# Patient Record
Sex: FEMALE | Race: BLACK OR AFRICAN AMERICAN | Employment: PART TIME | URBAN - METROPOLITAN AREA
[De-identification: names, ages, dates, MRNs, and addresses within clinical notes are randomized per-mention and may not be internally consistent; named-entity substitution may affect disease eponyms.]

---

## 2017-07-06 ENCOUNTER — HOSPITAL ENCOUNTER (OUTPATIENT)
Dept: PREADMISSION TESTING | Age: 47
Discharge: HOME | End: 2017-07-06
Payer: COMMERCIAL

## 2017-07-06 VITALS
HEART RATE: 68 BPM | BODY MASS INDEX: 25.74 KG/M2 | WEIGHT: 164 LBS | SYSTOLIC BLOOD PRESSURE: 126 MMHG | TEMPERATURE: 97.6 F | DIASTOLIC BLOOD PRESSURE: 79 MMHG | OXYGEN SATURATION: 100 % | HEIGHT: 67 IN | RESPIRATION RATE: 16 BRPM

## 2017-07-06 LAB
ATRIAL RATE: 64 BPM
BASOPHILS # BLD AUTO: 0 K/UL (ref 0–0.1)
BASOPHILS # BLD: 0 % (ref 0–1)
CALCULATED P AXIS, ECG09: 52 DEGREES
CALCULATED R AXIS, ECG10: 9 DEGREES
CALCULATED T AXIS, ECG11: 23 DEGREES
DIAGNOSIS, 93000: NORMAL
EOSINOPHIL # BLD: 0.1 K/UL (ref 0–0.4)
EOSINOPHIL NFR BLD: 2 % (ref 0–7)
ERYTHROCYTE [DISTWIDTH] IN BLOOD BY AUTOMATED COUNT: 14.5 % (ref 11.5–14.5)
HCT VFR BLD AUTO: 36.3 % (ref 35–47)
HGB BLD-MCNC: 12.1 G/DL (ref 11.5–16)
LYMPHOCYTES # BLD AUTO: 47 % (ref 12–49)
LYMPHOCYTES # BLD: 2.9 K/UL (ref 0.8–3.5)
MCH RBC QN AUTO: 27.8 PG (ref 26–34)
MCHC RBC AUTO-ENTMCNC: 33.3 G/DL (ref 30–36.5)
MCV RBC AUTO: 83.3 FL (ref 80–99)
MONOCYTES # BLD: 0.4 K/UL (ref 0–1)
MONOCYTES NFR BLD AUTO: 7 % (ref 5–13)
NEUTS SEG # BLD: 2.7 K/UL (ref 1.8–8)
NEUTS SEG NFR BLD AUTO: 44 % (ref 32–75)
P-R INTERVAL, ECG05: 182 MS
PLATELET # BLD AUTO: 301 K/UL (ref 150–400)
Q-T INTERVAL, ECG07: 432 MS
QRS DURATION, ECG06: 80 MS
QTC CALCULATION (BEZET), ECG08: 445 MS
RBC # BLD AUTO: 4.36 M/UL (ref 3.8–5.2)
VENTRICULAR RATE, ECG03: 64 BPM
WBC # BLD AUTO: 6.1 K/UL (ref 3.6–11)

## 2017-07-06 PROCEDURE — 85025 COMPLETE CBC W/AUTO DIFF WBC: CPT | Performed by: ANESTHESIOLOGY

## 2017-07-06 PROCEDURE — 36415 COLL VENOUS BLD VENIPUNCTURE: CPT | Performed by: ANESTHESIOLOGY

## 2017-07-06 PROCEDURE — 93005 ELECTROCARDIOGRAM TRACING: CPT

## 2017-07-06 NOTE — PERIOP NOTES
[facility]  PREOPERATIVE INSTRUCTIONS    Surgery Date:   7/11/17    Surgery arrival time given by surgeon: NO  (If [facility] staff will call you between 4pm - 8pm the day before surgery with your arrival time. If your surgery is on a Monday, we will call you the preceding Friday. Please call [phone] after 7pm if you did not receive your arrival time.)    1. Report  to the 2nd Floor Admitting Desk at the time you were told the night before surgery. Bring your insurance card, photo identification, and any copayment (if applicable). 2. You must have a responsible adult to drive you home and stay with you the first 24 hours after surgery if you are going home the same day of your surgery. 3. Nothing to eat or drink after midnight the night before surgery. This means NO water, gum, mints, coffee, juice, etc.    4. No alcoholic beverages 24 hours before and after your surgery. 5. If you are being admitted to the hospital,please leave personal belongings/luggage in your car until you have an assigned hospital room number. 6. The hospital discharge time is 12pm NOON. Your adult  should be here prior to the 12pm NOON discharge time. 7. NO Aspirin and/or any non-steroidal anti-inflammatory drugs (i.e. Ibuprofen, Naproxen, Advil, Aleve) as directed by your surgeon. You may take Tylenol. Stop herbal supplements 1 week prior to  surgery. 8. If you are currently taking Plavix, Coumadin,or any other blood-thinning/ anticoagulant medication contact your surgeon for instructions. 9. Wear comfortable clothes. Wear your glasses instead of contacts. Please leave all money, jewelry and valuables at home. No make up, particularly mascara or finger nail polish, the day of surgery. 10.  REMOVE ALL body piercings, rings,and jewelry and leave at home. Wear your hair loose or down.; no pony-tails, buns, or any metal hair clips.    11. If you shower the morning of surgery, please do not apply any lotions, powders, or deodorants afterwards. Do not shave any body area within 24 hours of your surgery. 12. Please follow all instructions to avoid any potential surgical cancellation. 13. Should your physical condition change, (i.e. fever, cold, flu, etc.) please notify your surgeon as soon as possible. 14. It is important to be on time. If a situation occurs where you may be delayed, please call:  [phone] /  on the day of surgery. 15. The Preadmission Testing staff can be reached at 21 [phone]. 16. MEDICATIONS TO TAKE THE MORNING OF SURGERY WITH A SIP OF WATER: none  17. Special instructions: free  parking,Bring completed PTA medication list with you on the day of your surgery. The patient was contacted  in person. She  verbalize  understanding of all instructions does not  need reinforcement.

## 2017-07-06 NOTE — PERIOP NOTES
Patient stated she would complete the blood transfusion form at home and bring a revised copy of her Advanced Medical  Directives on the AM of surgery . She wanted to speak with  before signing the operative consent and blood  refusal consent .

## 2017-07-10 ENCOUNTER — ANESTHESIA EVENT (OUTPATIENT)
Dept: SURGERY | Age: 47
DRG: 328 | End: 2017-07-10
Payer: COMMERCIAL

## 2017-07-11 ENCOUNTER — ANESTHESIA (OUTPATIENT)
Dept: SURGERY | Age: 47
DRG: 328 | End: 2017-07-11
Payer: COMMERCIAL

## 2017-07-11 ENCOUNTER — HOSPITAL ENCOUNTER (INPATIENT)
Age: 47
LOS: 4 days | Discharge: HOME | DRG: 328 | End: 2017-07-15
Attending: SURGERY | Admitting: SURGERY
Payer: COMMERCIAL

## 2017-07-11 PROBLEM — K21.9 ACID REFLUX: Status: ACTIVE | Noted: 2017-07-11

## 2017-07-11 LAB — HCG UR QL: NEGATIVE

## 2017-07-11 PROCEDURE — 77030011640 HC PAD GRND REM COVD -A: Performed by: SURGERY

## 2017-07-11 PROCEDURE — 74011000250 HC RX REV CODE- 250: Performed by: ANESTHESIOLOGY

## 2017-07-11 PROCEDURE — 77030002912 HC SUT ETHBND J&J -A: Performed by: SURGERY

## 2017-07-11 PROCEDURE — 77030034849: Performed by: SURGERY

## 2017-07-11 PROCEDURE — 77030008771 HC TU NG SALEM SUMP -A: Performed by: ANESTHESIOLOGY

## 2017-07-11 PROCEDURE — 77030018684: Performed by: SURGERY

## 2017-07-11 PROCEDURE — 74011250636 HC RX REV CODE- 250/636: Performed by: SURGERY

## 2017-07-11 PROCEDURE — 77030020747 HC TU INSUF ENDOSC TELE -A: Performed by: SURGERY

## 2017-07-11 PROCEDURE — 74011000250 HC RX REV CODE- 250: Performed by: SURGERY

## 2017-07-11 PROCEDURE — 76210000016 HC OR PH I REC 1 TO 1.5 HR: Performed by: SURGERY

## 2017-07-11 PROCEDURE — 77030032490 HC SLV COMPR SCD KNE COVD -B: Performed by: SURGERY

## 2017-07-11 PROCEDURE — 0BUS4JZ SUPPLEMENT LEFT DIAPHRAGM WITH SYNTH SUB, PERC ENDO APPROACH: ICD-10-PCS | Performed by: SURGERY

## 2017-07-11 PROCEDURE — 74011250636 HC RX REV CODE- 250/636

## 2017-07-11 PROCEDURE — 77030036876 HC STPLR ENDO FIX DEV RELIATACK COVD -F: Performed by: SURGERY

## 2017-07-11 PROCEDURE — 77030002933 HC SUT MCRYL J&J -A: Performed by: SURGERY

## 2017-07-11 PROCEDURE — 76010000162 HC OR TIME 1.5 TO 2 HR INTENSV-TIER 1: Performed by: SURGERY

## 2017-07-11 PROCEDURE — 65270000029 HC RM PRIVATE

## 2017-07-11 PROCEDURE — C9290 INJ, BUPIVACAINE LIPOSOME: HCPCS | Performed by: SURGERY

## 2017-07-11 PROCEDURE — C9113 INJ PANTOPRAZOLE SODIUM, VIA: HCPCS | Performed by: SURGERY

## 2017-07-11 PROCEDURE — 77030036733 HC ENDOLP LIG VCRL SUT J&J -C: Performed by: SURGERY

## 2017-07-11 PROCEDURE — 77030022703 HC LIGASURE  BLNT LAPSCP SEAL COVD -E: Performed by: SURGERY

## 2017-07-11 PROCEDURE — 77030035043 HC TRCR ENDOSC OPTCL BLDLSS COVD -B: Performed by: SURGERY

## 2017-07-11 PROCEDURE — 77030020263 HC SOL INJ SOD CL0.9% LFCR 1000ML: Performed by: SURGERY

## 2017-07-11 PROCEDURE — 77030032490 HC SLV COMPR SCD KNE COVD -B

## 2017-07-11 PROCEDURE — 81025 URINE PREGNANCY TEST: CPT

## 2017-07-11 PROCEDURE — 0DV44ZZ RESTRICTION OF ESOPHAGOGASTRIC JUNCTION, PERCUTANEOUS ENDOSCOPIC APPROACH: ICD-10-PCS | Performed by: SURGERY

## 2017-07-11 PROCEDURE — 77030008684 HC TU ET CUF COVD -B: Performed by: ANESTHESIOLOGY

## 2017-07-11 PROCEDURE — 0BUR4JZ SUPPLEMENT R DIAPHRAGM WITH SYNTH SUB, PERC ENDO APPROACH: ICD-10-PCS | Performed by: SURGERY

## 2017-07-11 PROCEDURE — 77030016151 HC PROTCTR LNS DFOG COVD -B: Performed by: SURGERY

## 2017-07-11 PROCEDURE — 77030020782 HC GWN BAIR PAWS FLX 3M -B

## 2017-07-11 PROCEDURE — 77030026438 HC STYL ET INTUB CARD -A: Performed by: ANESTHESIOLOGY

## 2017-07-11 PROCEDURE — 77030031139 HC SUT VCRL2 J&J -A: Performed by: SURGERY

## 2017-07-11 PROCEDURE — 77030019908 HC STETH ESOPH SIMS -A: Performed by: ANESTHESIOLOGY

## 2017-07-11 PROCEDURE — 74011250636 HC RX REV CODE- 250/636: Performed by: ANESTHESIOLOGY

## 2017-07-11 PROCEDURE — C1781 MESH (IMPLANTABLE): HCPCS | Performed by: SURGERY

## 2017-07-11 PROCEDURE — 77030013079 HC BLNKT BAIR HGGR 3M -A: Performed by: ANESTHESIOLOGY

## 2017-07-11 PROCEDURE — 77030037032 HC INSRT SCIS CLICKLLINE DISP STOR -B: Performed by: SURGERY

## 2017-07-11 PROCEDURE — 76060000034 HC ANESTHESIA 1.5 TO 2 HR: Performed by: SURGERY

## 2017-07-11 PROCEDURE — 77030008606 HC TRCR ENDOSC KII AMR -B: Performed by: SURGERY

## 2017-07-11 PROCEDURE — 74011000250 HC RX REV CODE- 250

## 2017-07-11 DEVICE — MESH HERN W7XL10CM SYN TISS REINF BIOABSRB DISP BIO-A: Type: IMPLANTABLE DEVICE | Site: ESOPHAGUS | Status: FUNCTIONAL

## 2017-07-11 RX ORDER — SODIUM CHLORIDE, SODIUM LACTATE, POTASSIUM CHLORIDE, CALCIUM CHLORIDE 600; 310; 30; 20 MG/100ML; MG/100ML; MG/100ML; MG/100ML
INJECTION, SOLUTION INTRAVENOUS
Status: DISCONTINUED | OUTPATIENT
Start: 2017-07-11 | End: 2017-07-11 | Stop reason: HOSPADM

## 2017-07-11 RX ORDER — KETOROLAC TROMETHAMINE 30 MG/ML
30 INJECTION, SOLUTION INTRAMUSCULAR; INTRAVENOUS EVERY 6 HOURS
Status: DISCONTINUED | OUTPATIENT
Start: 2017-07-12 | End: 2017-07-15 | Stop reason: HOSPADM

## 2017-07-11 RX ORDER — HYDROMORPHONE HCL IN 0.9% NACL 15 MG/30ML
PATIENT CONTROLLED ANALGESIA VIAL INTRAVENOUS CONTINUOUS
Status: DISCONTINUED | OUTPATIENT
Start: 2017-07-11 | End: 2017-07-13

## 2017-07-11 RX ORDER — SODIUM CHLORIDE 0.9 % (FLUSH) 0.9 %
5-10 SYRINGE (ML) INJECTION AS NEEDED
Status: DISCONTINUED | OUTPATIENT
Start: 2017-07-11 | End: 2017-07-11 | Stop reason: HOSPADM

## 2017-07-11 RX ORDER — ONDANSETRON 2 MG/ML
INJECTION INTRAMUSCULAR; INTRAVENOUS AS NEEDED
Status: DISCONTINUED | OUTPATIENT
Start: 2017-07-11 | End: 2017-07-11 | Stop reason: HOSPADM

## 2017-07-11 RX ORDER — NEOSTIGMINE METHYLSULFATE 1 MG/ML
INJECTION INTRAVENOUS AS NEEDED
Status: DISCONTINUED | OUTPATIENT
Start: 2017-07-11 | End: 2017-07-11 | Stop reason: HOSPADM

## 2017-07-11 RX ORDER — SODIUM CHLORIDE, SODIUM LACTATE, POTASSIUM CHLORIDE, CALCIUM CHLORIDE 600; 310; 30; 20 MG/100ML; MG/100ML; MG/100ML; MG/100ML
100 INJECTION, SOLUTION INTRAVENOUS CONTINUOUS
Status: DISCONTINUED | OUTPATIENT
Start: 2017-07-11 | End: 2017-07-11 | Stop reason: HOSPADM

## 2017-07-11 RX ORDER — MIDAZOLAM HYDROCHLORIDE 1 MG/ML
INJECTION, SOLUTION INTRAMUSCULAR; INTRAVENOUS AS NEEDED
Status: DISCONTINUED | OUTPATIENT
Start: 2017-07-11 | End: 2017-07-11 | Stop reason: HOSPADM

## 2017-07-11 RX ORDER — LIDOCAINE HYDROCHLORIDE 10 MG/ML
0.1 INJECTION, SOLUTION EPIDURAL; INFILTRATION; INTRACAUDAL; PERINEURAL AS NEEDED
Status: DISCONTINUED | OUTPATIENT
Start: 2017-07-11 | End: 2017-07-11 | Stop reason: HOSPADM

## 2017-07-11 RX ORDER — SODIUM CHLORIDE 0.9 % (FLUSH) 0.9 %
5-10 SYRINGE (ML) INJECTION EVERY 8 HOURS
Status: DISCONTINUED | OUTPATIENT
Start: 2017-07-11 | End: 2017-07-11 | Stop reason: HOSPADM

## 2017-07-11 RX ORDER — HYDROMORPHONE HYDROCHLORIDE 1 MG/ML
.25-1 INJECTION, SOLUTION INTRAMUSCULAR; INTRAVENOUS; SUBCUTANEOUS
Status: DISCONTINUED | OUTPATIENT
Start: 2017-07-11 | End: 2017-07-11 | Stop reason: HOSPADM

## 2017-07-11 RX ORDER — FENTANYL CITRATE 50 UG/ML
INJECTION, SOLUTION INTRAMUSCULAR; INTRAVENOUS AS NEEDED
Status: DISCONTINUED | OUTPATIENT
Start: 2017-07-11 | End: 2017-07-11 | Stop reason: HOSPADM

## 2017-07-11 RX ORDER — PROCHLORPERAZINE EDISYLATE 5 MG/ML
5 INJECTION INTRAMUSCULAR; INTRAVENOUS
Status: DISCONTINUED | OUTPATIENT
Start: 2017-07-11 | End: 2017-07-15 | Stop reason: HOSPADM

## 2017-07-11 RX ORDER — GLYCOPYRROLATE 0.2 MG/ML
INJECTION INTRAMUSCULAR; INTRAVENOUS AS NEEDED
Status: DISCONTINUED | OUTPATIENT
Start: 2017-07-11 | End: 2017-07-11 | Stop reason: HOSPADM

## 2017-07-11 RX ORDER — DIPHENHYDRAMINE HYDROCHLORIDE 50 MG/ML
25 INJECTION, SOLUTION INTRAMUSCULAR; INTRAVENOUS
Status: DISCONTINUED | OUTPATIENT
Start: 2017-07-11 | End: 2017-07-15 | Stop reason: HOSPADM

## 2017-07-11 RX ORDER — PROPOFOL 10 MG/ML
INJECTION, EMULSION INTRAVENOUS AS NEEDED
Status: DISCONTINUED | OUTPATIENT
Start: 2017-07-11 | End: 2017-07-11 | Stop reason: HOSPADM

## 2017-07-11 RX ORDER — DEXAMETHASONE SODIUM PHOSPHATE 4 MG/ML
INJECTION, SOLUTION INTRA-ARTICULAR; INTRALESIONAL; INTRAMUSCULAR; INTRAVENOUS; SOFT TISSUE AS NEEDED
Status: DISCONTINUED | OUTPATIENT
Start: 2017-07-11 | End: 2017-07-11 | Stop reason: HOSPADM

## 2017-07-11 RX ORDER — ENOXAPARIN SODIUM 100 MG/ML
40 INJECTION SUBCUTANEOUS EVERY 24 HOURS
Status: DISCONTINUED | OUTPATIENT
Start: 2017-07-11 | End: 2017-07-15 | Stop reason: HOSPADM

## 2017-07-11 RX ORDER — ROCURONIUM BROMIDE 10 MG/ML
INJECTION, SOLUTION INTRAVENOUS AS NEEDED
Status: DISCONTINUED | OUTPATIENT
Start: 2017-07-11 | End: 2017-07-11 | Stop reason: HOSPADM

## 2017-07-11 RX ORDER — SUCCINYLCHOLINE CHLORIDE 20 MG/ML
INJECTION INTRAMUSCULAR; INTRAVENOUS AS NEEDED
Status: DISCONTINUED | OUTPATIENT
Start: 2017-07-11 | End: 2017-07-11 | Stop reason: HOSPADM

## 2017-07-11 RX ORDER — ONDANSETRON 2 MG/ML
4 INJECTION INTRAMUSCULAR; INTRAVENOUS EVERY 4 HOURS
Status: DISCONTINUED | OUTPATIENT
Start: 2017-07-11 | End: 2017-07-15 | Stop reason: HOSPADM

## 2017-07-11 RX ORDER — CEFAZOLIN SODIUM IN 0.9 % NACL 2 G/50 ML
2 INTRAVENOUS SOLUTION, PIGGYBACK (ML) INTRAVENOUS ONCE
Status: COMPLETED | OUTPATIENT
Start: 2017-07-11 | End: 2017-07-11

## 2017-07-11 RX ORDER — SODIUM CHLORIDE 9 MG/ML
75 INJECTION, SOLUTION INTRAVENOUS CONTINUOUS
Status: DISCONTINUED | OUTPATIENT
Start: 2017-07-11 | End: 2017-07-13

## 2017-07-11 RX ORDER — LIDOCAINE HYDROCHLORIDE 20 MG/ML
INJECTION, SOLUTION EPIDURAL; INFILTRATION; INTRACAUDAL; PERINEURAL AS NEEDED
Status: DISCONTINUED | OUTPATIENT
Start: 2017-07-11 | End: 2017-07-11 | Stop reason: HOSPADM

## 2017-07-11 RX ADMIN — PROMETHAZINE HYDROCHLORIDE 6.25 MG: 25 INJECTION INTRAMUSCULAR; INTRAVENOUS at 17:57

## 2017-07-11 RX ADMIN — FENTANYL CITRATE 100 MCG: 50 INJECTION, SOLUTION INTRAMUSCULAR; INTRAVENOUS at 15:22

## 2017-07-11 RX ADMIN — ROCURONIUM BROMIDE 5 MG: 10 INJECTION, SOLUTION INTRAVENOUS at 15:22

## 2017-07-11 RX ADMIN — SODIUM CHLORIDE, SODIUM LACTATE, POTASSIUM CHLORIDE, AND CALCIUM CHLORIDE 100 ML/HR: 600; 310; 30; 20 INJECTION, SOLUTION INTRAVENOUS at 12:46

## 2017-07-11 RX ADMIN — GLYCOPYRROLATE 0.4 MG: 0.2 INJECTION INTRAMUSCULAR; INTRAVENOUS at 17:02

## 2017-07-11 RX ADMIN — ENOXAPARIN SODIUM 40 MG: 40 INJECTION SUBCUTANEOUS at 20:47

## 2017-07-11 RX ADMIN — SODIUM CHLORIDE 75 ML/HR: 900 INJECTION, SOLUTION INTRAVENOUS at 17:41

## 2017-07-11 RX ADMIN — MIDAZOLAM HYDROCHLORIDE 2 MG: 1 INJECTION, SOLUTION INTRAMUSCULAR; INTRAVENOUS at 15:15

## 2017-07-11 RX ADMIN — ROCURONIUM BROMIDE 10 MG: 10 INJECTION, SOLUTION INTRAVENOUS at 15:28

## 2017-07-11 RX ADMIN — ONDANSETRON 4 MG: 2 INJECTION INTRAMUSCULAR; INTRAVENOUS at 16:57

## 2017-07-11 RX ADMIN — HYDROMORPHONE HYDROCHLORIDE 1 MG: 1 INJECTION, SOLUTION INTRAMUSCULAR; INTRAVENOUS; SUBCUTANEOUS at 17:48

## 2017-07-11 RX ADMIN — ONDANSETRON 4 MG: 2 INJECTION INTRAMUSCULAR; INTRAVENOUS at 20:48

## 2017-07-11 RX ADMIN — SUCCINYLCHOLINE CHLORIDE 100 MG: 20 INJECTION INTRAMUSCULAR; INTRAVENOUS at 15:22

## 2017-07-11 RX ADMIN — PROPOFOL 150 MG: 10 INJECTION, EMULSION INTRAVENOUS at 15:22

## 2017-07-11 RX ADMIN — DEXAMETHASONE SODIUM PHOSPHATE 4 MG: 4 INJECTION, SOLUTION INTRA-ARTICULAR; INTRALESIONAL; INTRAMUSCULAR; INTRAVENOUS; SOFT TISSUE at 15:26

## 2017-07-11 RX ADMIN — NEOSTIGMINE METHYLSULFATE 2 MG: 1 INJECTION INTRAVENOUS at 17:02

## 2017-07-11 RX ADMIN — FENTANYL CITRATE 25 MCG: 50 INJECTION, SOLUTION INTRAMUSCULAR; INTRAVENOUS at 16:49

## 2017-07-11 RX ADMIN — FENTANYL CITRATE 25 MCG: 50 INJECTION, SOLUTION INTRAMUSCULAR; INTRAVENOUS at 15:50

## 2017-07-11 RX ADMIN — HYDROMORPHONE HYDROCHLORIDE 0.5 MG: 1 INJECTION, SOLUTION INTRAMUSCULAR; INTRAVENOUS; SUBCUTANEOUS at 18:09

## 2017-07-11 RX ADMIN — CEFAZOLIN 2 G: 1 INJECTION, POWDER, FOR SOLUTION INTRAMUSCULAR; INTRAVENOUS; PARENTERAL at 15:37

## 2017-07-11 RX ADMIN — SODIUM CHLORIDE, SODIUM LACTATE, POTASSIUM CHLORIDE, CALCIUM CHLORIDE: 600; 310; 30; 20 INJECTION, SOLUTION INTRAVENOUS at 15:35

## 2017-07-11 RX ADMIN — FENTANYL CITRATE 50 MCG: 50 INJECTION, SOLUTION INTRAMUSCULAR; INTRAVENOUS at 17:17

## 2017-07-11 RX ADMIN — SODIUM CHLORIDE 40 MG: 9 INJECTION, SOLUTION INTRAMUSCULAR; INTRAVENOUS; SUBCUTANEOUS at 20:48

## 2017-07-11 RX ADMIN — FENTANYL CITRATE 50 MCG: 50 INJECTION, SOLUTION INTRAMUSCULAR; INTRAVENOUS at 16:52

## 2017-07-11 RX ADMIN — DIPHENHYDRAMINE HYDROCHLORIDE 25 MG: 50 INJECTION, SOLUTION INTRAMUSCULAR; INTRAVENOUS at 21:45

## 2017-07-11 RX ADMIN — Medication: at 17:42

## 2017-07-11 RX ADMIN — ROCURONIUM BROMIDE 35 MG: 10 INJECTION, SOLUTION INTRAVENOUS at 15:25

## 2017-07-11 RX ADMIN — LIDOCAINE HYDROCHLORIDE 80 MG: 20 INJECTION, SOLUTION EPIDURAL; INFILTRATION; INTRACAUDAL; PERINEURAL at 15:22

## 2017-07-11 NOTE — OP NOTES
OPERATIVE NOTE    Date of Procedure: 7/11/2017   Preoperative Diagnosis: SYMPTOMATIC PARAESOPHAGEAL HERNIA, REFRACTORY REFLUX  Postoperative Diagnosis: SAME  Procedure(s):   LAPAROSCOPIC PARAESOPHAGEAL HERNIA REPAIR WITH MESH  NISSEN FUNDOPLICATION  Surgeon(s) and Role:     * Jeffrey Wu MD - Primary         Assistant Staff:       Surgical Staff:  Circ-1: Molly Gutierrez RN  Circ-Relief: Breonna Camejo RN  Scrub Tech-1: Melany Meehan  Scrub Tech-Relief: Valentino Romance  Surg Asst-1: Stacy Zepeda  Surg Asst-Relief: Madonna Armijo; Lasha Escudero RN  Event Time In   Incision Start 1550   Incision Close 1708     Anesthesia: General   Estimated Blood Loss: Min  Specimens: * No specimens in log *   Findings: 6 cm hiatus   Complications: none  Implants:     Implant Name Type Inv. Item Serial No.  Lot No. LRB No. Used Action   MESH SHT BIO ABSORBABLE 7X10CM --  - [id number]   MESH SHT BIO ABSORBABLE 7X10CM --  [id number] [employer] N/A N/A 1 Implanted     INDICATION:  See Paper H/P. PROCEDURE:  The patient was placed on the operating table and secured in supine position. Sequential compression devices were applied.  General anesthesia was induced successfully. A time-out completed, verifying the correct the patient, procedure site, positioning, implants, and/or special equipment prior to beginning the case.    Orogastric tube was used to decompress the stomach and urinary catheter placed.  A foot board was placed and the patient was secure to the bed protecting a soft bend position of the knees.  The abdomen was prepped and draped in the usual sterile fashion.   The bed anchor of the liver retractor was placed under the right axilla of the patient, allowing visual clearance of the liver retractor post prior to draping.  At no time was the retractor in contact with the patient.  Through a left side, mid-axillary subcostal 5mm incision, Veress needle insufflation was established and maintained at 15mm Hg. The abdomen was then entered under direct camera vision with a 5 mm blunt tissue dissecting port.  The remaining ports were placed in the following fashion under direct endoscopic vision: Left side, midclavicular subcostal 5 mm port, subxiphoid 5 mm port, 8 mm midline port directly adjacent to the base of the falciform ligament, hands breath caudal from the subxiphoid port, and right side 5 mm midaxillary subcostal port.  No injuries were noted during port placement or during initial entry.       The patient was placed in steep reverse Trendelenburg position. Liver retractor was introduced in the right subcostal port to elevate the left lobe of liver and expose the hiatus. The hiatus revealed incarcerated stomach, 3 cm anterior opening.    Representative images were taken for the paper chart.  Pars flaccida and gastrohepatic ligament were opened with electrocautery. The right dianne was identified, opened and the plane between the right dianne - esophagus and the crural desiccation visualized. Dissection continued to peritoneum anterior over phrenoesophageal hiatus to left dianne. Avascular plane was opened bluntly. Dissection was then carried to crux of the crura. Short gastric vessels adjacent to the reduced cardia were taken gently with a vessel sealing device, being careful not to tear vascular connections to the spleen. This plane was opened to the posterior gastroesophageal junction and met with the previous right sided dissection. The left dianne was then similarly dissected, periotoneal covering left intact and the phrenoesophageal ligament was completely divided anteriorly. Hernia contents were gently pulled down from the chest below the diaphragm.  The vagus nerves were identified and protected. The esophagus was gently elevated with a closed grasper and dissection continued until the esophagus was fully mobilized. The orogastric tube was removed.  A penrose catheter was then passed under the esophagus and controlled with a Vicryl endo-loop. The esophagus was encircled to include the vagus nerves. At least 2 cm of intraperitoneal esophagus was visualized. 42F Bougie was passed by Anesthesia transorally into the stomach. The crura were re-approximated posteriorly with 0 Ethibond sutures, allowing a grasper to easily pass posteriorly. The bioabsorbable mesh was prepared and passed into the field. The mesh was tacked into place around the crura with absorbable tacks avoiding vena cava and aorta. Fundus was passed through the retroesophageal window, crossing midline for a 360 degree loose wrap. A shoe-shine technique was performed, allowing the proposed wrap position to lie in position without manipulation. A 3 cm wrap was secured in place with three interrupted 0 Ethi-bond sutures, with the first and second sutures incorporating some esophageal muscle. The bougie was removed under direct vision. 12 o'clock posterior wrap to right dianne Ethibond stitch, followed by 3 o'clock posterior left wrap to left dianne Ethibond stitch were placed in the natural lie position of the wrap. The liver retractor was removed under direct visualization and the liver inspected for any bleeding. Ports were removed and replaced sequentially looking for bleeding. The gallbladder, instruments and ports were removed from the field and pneumoperitoneum terminally released. The skin incisions were all closed with 4-0 monocryl, steristrips and tegaderm.  The patient tolerated the procedure well and was taken to the postanesthesia care unit in stable and satisfactory condition. I discussed the findings of the surgery with her family in the recovery area.     Nette Forbes MD

## 2017-07-11 NOTE — IP AVS SNAPSHOT
Chiqui Bills 
 
 
 [address] [facility] 
[phone] Patient: Isiah Cantu MRN: [id number] DMO:[birth year] You are allergic to the following Allergen Reactions Percocet (Oxycodone-Acetaminophen) Hives Itching Recent Documentation Height Weight Breastfeeding? BMI OB Status Smoking Status 1.702 m 74.4 kg No 25.69 kg/m2 Having regular periods Never Smoker Emergency Contacts Name Discharge Info Relation Home Work Mobile [facility] CAREGIVER [3] Spouse [3] [phone] About your hospitalization You were admitted on:  July 11, 2017 You last received care in the:  [facility] 4M POST SURG ORT 1 You were discharged on:  July 15, 2017 Unit phone number:  [phone] Why you were hospitalized Your primary diagnosis was:  Not on File Your diagnoses also included:  Acid Reflux Providers Seen During Your Hospitalizations Provider Role Specialty Primary office phone Cory Jessica MD Attending Provider General Surgery [phone] Your Primary Care Physician (PCP) Primary Care Physician Office Phone Office Fax NONE ** None ** ** None ** Follow-up Information Follow up With Details Comments Contact Info Cory Jessica MD Schedule an appointment as soon as possible for a visit in 2 weeks  [address] [facility] Surgical [address] 
[phone] None   None ([phone]) Patient stated that they have no PCP Cory Jessica MD In 2 weeks  [address] 
[phone] Current Discharge Medication List  
  
START taking these medications Dose & Instructions Dispensing Information Comments Morning Noon Evening Bedtime  
 acetaminophen 32MG/ML Soln solution Commonly known as:  TYLENOL  
   
 Your last dose was: Your next dose is:    
   
   
 Dose:  650 mg Take 20.3 mL by mouth every four (4) hours as needed. Indications: Pain, headache Quantity:  354 mL Refills:  1 HYDROmorphone 2 mg tablet Commonly known as:  DILAUDID Your last dose was: Your next dose is:    
   
   
 Dose:  2 mg Take 1 Tab by mouth every four (4) hours as needed. Max Daily Amount: 12 mg. Indications: Pain, Acute postoperative pain Quantity:  30 Tab Refills:  0  
     
   
   
   
  
 ondansetron 4 mg disintegrating tablet Commonly known as:  ZOFRAN ODT Your last dose was: Your next dose is:    
   
   
 Dose:  4 mg Take 1 Tab by mouth every eight (8) hours as needed for Nausea. Indications: PREVENTION OF POST-OPERATIVE NAUSEA AND VOMITING Quantity:  12 Tab Refills:  1  
     
   
   
   
  
 polyethylene glycol 17 gram packet Commonly known as:  Saul Pritchett Your last dose was: Your next dose is:    
   
   
 Dose:  17 g Take 1 Packet by mouth daily as needed. Indications: constipation Quantity:  10 Packet Refills:  1 CONTINUE these medications which have NOT CHANGED Dose & Instructions Dispensing Information Comments Morning Noon Evening Bedtime FLAXSEED OIL PO Your last dose was: Your next dose is:    
   
   
 Dose:  1000 mg Take 1,000 mg by mouth daily. Refills:  0  
     
   
   
   
  
 OTHER Your last dose was: Your next dose is:    
   
   
 Dose:  1 Tab 1 Tab daily. Super B vitamin Refills:  0 Where to Get Your Medications Information on where to get these meds will be given to you by the nurse or doctor. ! Ask your nurse or doctor about these medications  
  acetaminophen 32MG/ML Soln solution HYDROmorphone 2 mg tablet  
 ondansetron 4 mg disintegrating tablet  
 polyethylene glycol 17 gram packet Discharge Instructions Nissen Fundoplication: What to Expect at [facility] Your Recovery You may be sore and have some pain in your belly for several weeks after surgery. If you had laparoscopic surgery, you also may have pain near your shoulder for a day or two after surgery. It may be hard for you to swallow for up to 6 weeks after the surgery. You may also have cramping in your belly, feel bloated, or pass more gas than before. When you burp, you may not get as much relief as you did before the surgery. The cramping and bloating usually go away in 2 to 3 months, but you may continue to pass more gas for a long time. Because the surgery makes your stomach a little smaller, you may get full more quickly when you eat. In 2 to 3 months, the stomach adjusts and you will be able to eat your usual amounts of food. How quickly you recover depends on whether you had a laparoscopic or open surgery. After laparoscopic surgery, most people can go back to work or their normal routine in about 2 to 3 weeks, depending on their work. After open surgery, you may need 4 to 6 weeks to get back to your normal routine. This care sheet gives you a general idea about how long it will take for you to recover. But each person recovers at a different pace. Follow the steps below to get better as quickly as possible. How can you care for yourself at home? Activity · Rest when you feel tired. Getting enough sleep will help you recover. · Try to walk each day. Start out by walking a little more than you did the day before. Bit by bit, increase the amount you walk. Walking boosts blood flow and helps prevent pneumonia and constipation. · For about 2 weeks, or 4 to 6 weeks if you had an open surgery, avoid lifting objects heavier than about 15 to 20 pounds. This may include heavy grocery bags and milk containers, a heavy briefcase or backpack, cat litter or dog food bags, a vacuum , or a child. · Do not do sit-ups or any exercise or activity that uses your belly muscles. · You can be active and do things around the house as you can tolerate it. Do not take part in any activity where you could be hit in the belly. This could be sports or playing with children. · You may shower. Pat your incisions dry. If you had an open surgery, you need to keep the incision dry until it begins to heal. Do not take baths until your doctor says it is okay. · Ask your doctor when you can drive again. · Ask your doctor when it is okay for you to have sex. Diet · From now until August 4th (three weeks) , stay on a liquid or soft diet. This includes broths, soups, milk shakes, puddings, and mashed potatoes. When you can eat these without difficulty (after August 4th), try eating foods that are easy to swallow, such as ground meat, shredded chicken, fish, pasta, and soft vegetables. · Have 5 or 6 small meals each day instead of 2 or 3 large meals. · Chew each bite of food very well. Eat slowly. You may need to take 20 to 30 minutes to eat a meal. 
· Avoid crusty breads, bagels, tough meats, raw vegetables, nuts and seeds (including crackers and breads that have nuts and seeds), and other foods that are hard to digest. 
· If you feel full quickly, try to drink fluids between meals instead of with meals. · Avoid carbonated beverages, such as soda pop. · Avoid drinking with straws. This may help you swallow less air when you drink. · Gradually return to your normal foods. This usually takes 4 to 6 weeks. · You may notice that your bowel movements are not regular right after your surgery. This is common. Try to avoid constipation and straining with bowel movements. Take a fiber supplement every day. If you have not had a bowel movement after a couple of days, ask your doctor about taking a mild laxative. Medicines · Your doctor will tell you if and when you can restart your medicines.  He or she will also give you instructions about taking any new medicines. · If you take blood thinners, such as warfarin (Coumadin), clopidogrel (Plavix), or aspirin, be sure to talk to your doctor. He or she will tell you if and when to start taking those medicines again. Make sure that you understand exactly what your doctor wants you to do. · Take pain medicines exactly as directed. ¨ If the doctor gave you a prescription medicine for pain, take it as prescribed. ¨ If you are not taking a prescription pain medicine, ask your doctor if you can take an over-the-counter medicine. · If you think your pain medicine is making you sick to your stomach: 
¨ Take your medicine after meals (unless your doctor tells you not to). ¨ Ask your doctor for a different pain medicine. · If your doctor prescribed antibiotics, take them as directed. Do not stop taking them just because you feel better. You need to take the full course of antibiotics. · Continue to take your acid-reducing medicine for 1 month after surgery or as your doctor tells you. Incision care · If you have strips of tape on the cuts the doctor made (incisions), leave the tape on for a week or until it falls off. · Wash the area daily with warm, soapy water and pat it dry. Don't use hydrogen peroxide or alcohol, which can slow healing. You may cover the area with a gauze bandage if it weeps or rubs against clothing. Change the bandage every day. · Keep the area clean and dry. Follow-up care is a key part of your treatment and safety. Be sure to make and go to all appointments, and call your doctor if you are having problems. It's also a good idea to know your test results and keep a list of the medicines you take. When should you call for help? Call 911 anytime you think you may need emergency care. For example, call if: 
· You passed out (lost consciousness). · You have severe trouble breathing. · You have sudden chest pain and shortness of breath, or you cough up blood. · You have severe pain in your belly or chest. 
Call your doctor now or seek immediate medical care if: 
· You are sick to your stomach or cannot keep fluids down. · You have pain that does not get better after you take pain medicine. · You have signs of infection, such as: 
¨ Increased pain, swelling, warmth, or redness. ¨ Red streaks leading from the incision. ¨ Pus draining from the incision. ¨ A fever. · You have loose stitches, or your incision comes open. · You have signs of a blood clot, such as: 
¨ Pain in your calf, back of the knee, thigh, or groin. ¨ Redness and swelling in your leg or groin. · You have trouble passing urine or stool, especially if you have pain or swelling in your lower belly. Watch closely for any changes in your health, and be sure to contact your doctor if: 
· You have a cough that does not go away or one that brings up mucus. · You have shoulder pain that lasts more than 3 days. · You do not have a bowel movement after taking a laxative. Where can you learn more? Go to http://tono-verona.info/. Enter U659 in the search box to learn more about \"Nissen Fundoplication: What to Expect at Home. \" Current as of: August 9, 2016 Content Version: 11.3 © 0854-0530 [employer]. Care instructions adapted under license by [employer] (which disclaims liability or warranty for this information). If you have questions about a medical condition or this instruction, always ask your healthcare professional. [place] [address] any warranty or liability for your use of this information. Discharge Orders None Introducing [facility]! [place] introduces [employer] patient portal. Now you can access parts of your medical record, email your doctor's office, and request medication refills online.    
 
1. In your internet browser, go to [email or web address] 2. Click on the First Time User? Click Here link in the Sign In box. You will see the New Member Sign Up page. 3. Enter your [employer] Access Code exactly as it appears below. You will not need to use this code after youve completed the sign-up process. If you do not sign up before the expiration date, you must request a new code. · [employer] Access Code: [email or web address] Expires: 10/4/2017  7:35 AM 
 
4. Enter the last four digits of your Social Security Number (xxxx) and Date of Birth (mm/dd/yyyy) as indicated and click Submit. You will be taken to the next sign-up page. 5. Create a [employer] ID. This will be your [employer] login ID and cannot be changed, so think of one that is secure and easy to remember. 6. Create a [employer] password. You can change your password at any time. 7. Enter your Password Reset Question and Answer. This can be used at a later time if you forget your password. 8. Enter your e-mail address. You will receive e-mail notification when new information is available in [address] 9. Click Sign Up. You can now view and download portions of your medical record. 10. Click the Download Summary menu link to download a portable copy of your medical information. If you have questions, please visit the Frequently Asked Questions section of the [employer] website. Remember, [employer] is NOT to be used for urgent needs. For medical emergencies, dial 911. Now available from your iPhone and Android! General Information Please provide this summary of care documentation to your next provider. Patient Signature:  ____________________________________________________________ Date:  ____________________________________________________________  
  
James Ala Provider Signature:  ____________________________________________________________ Date:  ____________________________________________________________

## 2017-07-11 NOTE — BRIEF OP NOTE
OPERATIVE NOTE    Date of Procedure: 7/11/2017   Preoperative Diagnosis: SYMPTOMATIC PARAESOPHAGEAL HERNIA, REFRACTORY REFLUX  Postoperative Diagnosis: SAME  Procedure(s):   LAPAROSCOPIC PARAESOPHAGEAL HERNIA REPAIR WITH MESH  NISSEN FUNDOPLICATION  Surgeon(s) and Role:     * Cory Jessica MD - Primary         Assistant Staff:       Surgical Staff:  Circ-1: Vamshi Roper RN  Circ-Relief: Sai Marin RN  Scrub Tech-1: Edwin Gerson  Scrub Tech-Relief: Khalif Kaba  Surg Asst-1: Juan Francisco Hanna  Surg Asst-Relief: Kayleen Mccord; Sid Holman RN  Event Time In   Incision Start 1550   Incision Close 1708     Anesthesia: General   Estimated Blood Loss: Min  Specimens: * No specimens in log *   Findings: 6 cm hiatus   Complications: none  Implants:     Implant Name Type Inv. Item Serial No.  Lot No. LRB No. Used Action   MESH SHT BIO ABSORBABLE 7X10CM --  - [id number]   MESH SHT BIO ABSORBABLE 7X10CM --  [id number] N/A N/A 1 Implanted     INDICATION:  See Paper H/P. PROCEDURE:  The patient was placed on the operating table and secured in supine position. Sequential compression devices were applied.  General anesthesia was induced successfully. A time-out completed, verifying the correct the patient, procedure site, positioning, implants, and/or special equipment prior to beginning the case.    Orogastric tube was used to decompress the stomach and urinary catheter placed.  A foot board was placed and the patient was secure to the bed protecting a soft bend position of the knees.  The abdomen was prepped and draped in the usual sterile fashion.   The bed anchor of the liver retractor was placed under the right axilla of the patient, allowing visual clearance of the liver retractor post prior to draping.  At no time was the retractor in contact with the patient.  Through a left side, mid-axillary subcostal 5mm incision, Veress needle insufflation was established and maintained at 15mm Hg. The abdomen was then entered under direct camera vision with a 5 mm blunt tissue dissecting port.  The remaining ports were placed in the following fashion under direct endoscopic vision: Left side, midclavicular subcostal 5 mm port, subxiphoid 5 mm port, 8 mm midline port directly adjacent to the base of the falciform ligament, hands breath caudal from the subxiphoid port, and right side 5 mm midaxillary subcostal port.  No injuries were noted during port placement or during initial entry.       The patient was placed in steep reverse Trendelenburg position. Liver retractor was introduced in the right subcostal port to elevate the left lobe of liver and expose the hiatus. The hiatus revealed incarcerated stomach, 3 cm anterior opening.    Representative images were taken for the paper chart.  Pars flaccida and gastrohepatic ligament were opened with electrocautery. The right dianne was identified, opened and the plane between the right dianne - esophagus and the crural desiccation visualized. Dissection continued to peritoneum anterior over phrenoesophageal hiatus to left dianne. Avascular plane was opened bluntly. Dissection was then carried to crux of the crura. Short gastric vessels adjacent to the reduced cardia were taken gently with a vessel sealing device, being careful not to tear vascular connections to the spleen. This plane was opened to the posterior gastroesophageal junction and met with the previous right sided dissection. The left dianne was then similarly dissected, periotoneal covering left intact and the phrenoesophageal ligament was completely divided anteriorly. Hernia contents were gently pulled down from the chest below the diaphragm.  The vagus nerves were identified and protected. The esophagus was gently elevated with a closed grasper and dissection continued until the esophagus was fully mobilized. The orogastric tube was removed.  A penrose catheter was then passed under the esophagus and controlled with a Vicryl endo-loop. The esophagus was encircled to include the vagus nerves. At least 2 cm of intraperitoneal esophagus was visualized. 42F Bougie was passed by Anesthesia transorally into the stomach. The crura were re-approximated posteriorly with 0 Ethibond sutures, allowing a grasper to easily pass posteriorly. The bioabsorbable mesh was prepared and passed into the field. The mesh was tacked into place around the crura with absorbable tacks avoiding vena cava and aorta. Fundus was passed through the retroesophageal window, crossing midline for a 360 degree loose wrap. A shoe-shine technique was performed, allowing the proposed wrap position to lie in position without manipulation. A 3 cm wrap was secured in place with three interrupted 0 Ethi-bond sutures, with the first and second sutures incorporating some esophageal muscle. The bougie was removed under direct vision. 12 o'clock posterior wrap to right dianne Ethibond stitch, followed by 3 o'clock posterior left wrap to left dianne Ethibond stitch were placed in the natural lie position of the wrap. The liver retractor was removed under direct visualization and the liver inspected for any bleeding. Ports were removed and replaced sequentially looking for bleeding. The gallbladder, instruments and ports were removed from the field and pneumoperitoneum terminally released. The skin incisions were all closed with 4-0 monocryl, steristrips and tegaderm.  The patient tolerated the procedure well and was taken to the postanesthesia care unit in stable and satisfactory condition. I discussed the findings of the surgery with her family in the recovery area.     Olga Olson MD

## 2017-07-11 NOTE — PERIOP NOTES
TRANSFER - OUT REPORT:    Verbal report given to dolores rn(name) on Cassie Pham  being transferred to [facility](unit) for routine post - op       Report consisted of patients Situation, Background, Assessment and   Recommendations(SBAR). Information from the following report(s) SBAR, Procedure Summary, Intake/Output and MAR was reviewed with the receiving nurse. Lines:   Peripheral IV 07/11/17 Right;Upper Arm (Active)   Site Assessment Clean, dry, & intact 7/11/2017  5:24 PM   Phlebitis Assessment 0 7/11/2017  5:24 PM   Infiltration Assessment 0 7/11/2017  5:24 PM   Dressing Status Clean, dry, & intact 7/11/2017  5:24 PM   Dressing Type Tape;Transparent 7/11/2017  5:24 PM   Hub Color/Line Status Pink 7/11/2017  5:24 PM   Action Taken Open ports on tubing capped 7/11/2017 12:45 PM   Alcohol Cap Used Yes 7/11/2017 12:45 PM       Peripheral IV 07/11/17 Left Wrist (Active)   Site Assessment Clean, dry, & intact 7/11/2017  5:24 PM   Phlebitis Assessment 0 7/11/2017  5:24 PM   Infiltration Assessment 0 7/11/2017  5:24 PM   Dressing Status Clean, dry, & intact 7/11/2017  5:24 PM   Dressing Type Tape;Transparent 7/11/2017  5:24 PM   Hub Color/Line Status Green 7/11/2017  5:24 PM        Opportunity for questions and clarification was provided.       Patient transported with:   O2 @ 2 liters  Registered Nurse

## 2017-07-11 NOTE — IP AVS SNAPSHOT
Current Discharge Medication List  
  
START taking these medications Dose & Instructions Dispensing Information Comments Morning Noon Evening Bedtime  
 acetaminophen 32MG/ML Soln solution Commonly known as:  TYLENOL Your last dose was: Your next dose is:    
   
   
 Dose:  650 mg Take 20.3 mL by mouth every four (4) hours as needed. Indications: Pain, headache Quantity:  354 mL Refills:  1 HYDROmorphone 2 mg tablet Commonly known as:  DILAUDID Your last dose was: Your next dose is:    
   
   
 Dose:  2 mg Take 1 Tab by mouth every four (4) hours as needed. Max Daily Amount: 12 mg. Indications: Pain, Acute postoperative pain Quantity:  30 Tab Refills:  0  
     
   
   
   
  
 ondansetron 4 mg disintegrating tablet Commonly known as:  ZOFRAN ODT Your last dose was: Your next dose is:    
   
   
 Dose:  4 mg Take 1 Tab by mouth every eight (8) hours as needed for Nausea. Indications: PREVENTION OF POST-OPERATIVE NAUSEA AND VOMITING Quantity:  12 Tab Refills:  1  
     
   
   
   
  
 polyethylene glycol 17 gram packet Commonly known as:  Julia Plane Your last dose was: Your next dose is:    
   
   
 Dose:  17 g Take 1 Packet by mouth daily as needed. Indications: constipation Quantity:  10 Packet Refills:  1 CONTINUE these medications which have NOT CHANGED Dose & Instructions Dispensing Information Comments Morning Noon Evening Bedtime FLAXSEED OIL PO Your last dose was: Your next dose is:    
   
   
 Dose:  1000 mg Take 1,000 mg by mouth daily. Refills:  0  
     
   
   
   
  
 OTHER Your last dose was: Your next dose is:    
   
   
 Dose:  1 Tab 1 Tab daily. Super B vitamin Refills:  0 Where to Get Your Medications Information on where to get these meds will be given to you by the nurse or doctor. ! Ask your nurse or doctor about these medications  
  acetaminophen 32MG/ML Soln solution HYDROmorphone 2 mg tablet  
 ondansetron 4 mg disintegrating tablet  
 polyethylene glycol 17 gram packet

## 2017-07-11 NOTE — ANESTHESIA POSTPROCEDURE EVALUATION
Post-Anesthesia Evaluation and Assessment    Patient: Kali Larson MRN: [id number]  SSN: [id number]    YOB: 1970  Age: 55 y.o. Sex: female       Cardiovascular Function/Vital Signs  Visit Vitals    /89    Pulse 78    Temp 36.4 °C (97.6 °F)    Resp 19    SpO2 100%       Patient is status post general anesthesia for Procedure(s):  LAPAROSCOPIC PARAESOPHAGEAL HERNIA REPAIR WITH MESH/NISSEN FUNDOPLICATION. Nausea/Vomiting: None    Postoperative hydration reviewed and adequate. Pain:  Pain Scale 1: Numeric (0 - 10) (07/11/17 1809)  Pain Intensity 1: 9 (07/11/17 1809)   Managed    Neurological Status:   Neuro (WDL): Within Defined Limits (07/11/17 1721)  Neuro  LUE Motor Response: Purposeful (07/11/17 1721)  LLE Motor Response: Purposeful (07/11/17 1721)  RUE Motor Response: Purposeful (07/11/17 1721)  RLE Motor Response: Purposeful (07/11/17 1721)   At baseline    Mental Status and Level of Consciousness: Arousable    Pulmonary Status:   O2 Device: Nasal cannula (07/11/17 1721)   Adequate oxygenation and airway patent    Complications related to anesthesia: None    Post-anesthesia assessment completed.  No concerns    Signed By: Kennedi Gonzalez MD     July 11, 2017

## 2017-07-11 NOTE — ANESTHESIA PREPROCEDURE EVALUATION
Anesthetic History   No history of anesthetic complications            Review of Systems / Medical History  Patient summary reviewed, nursing notes reviewed and pertinent labs reviewed    Pulmonary  Within defined limits                 Neuro/Psych   Within defined limits           Cardiovascular  Within defined limits                Exercise tolerance: >4 METS  Comments: Palpitations, recent echo normal   GI/Hepatic/Renal  Within defined limits   GERD           Endo/Other  Within defined limits           Other Findings              Physical Exam    Airway  Mallampati: II    Neck ROM: normal range of motion   Mouth opening: Normal     Cardiovascular  Regular rate and rhythm,  S1 and S2 normal,  no murmur, click, rub, or gallop  Rhythm: regular  Rate: normal         Dental  No notable dental hx       Pulmonary  Breath sounds clear to auscultation               Abdominal  GI exam deferred       Other Findings            Anesthetic Plan    ASA: 2  Anesthesia type: general          Induction: Intravenous  Anesthetic plan and risks discussed with: Patient

## 2017-07-11 NOTE — BRIEF OP NOTE
BRIEF OPERATIVE NOTE    Date of Procedure: 7/11/2017   Preoperative Diagnosis: PARAESOPHAGEAL HERNIA  Postoperative Diagnosis: PARAESOPHAGEAL HERNIA    Procedure(s):   LAPAROSCOPIC PARAESOPHAGEAL HERNIA REPAIR WITH MESH  NISSEN FUNDOPLICATION  Surgeon(s) and Role:     * Olga Olson MD - Primary         Assistant Staff:       Surgical Staff:  Circ-1: Narayan Griffin RN  Circ-Relief: Piyush Spencer RN  Scrub Tech-1: Jamie Florentino  Scrub Tech-Relief: Malathi Tinoco  Surg Asst-1: Yanira Smith  Surg Asst-Relief: Dewey Bell; Daniel Vargas RN  Event Time In   Incision Start 1550   Incision Close 1708     Anesthesia: General   Estimated Blood Loss: Min  Specimens: * No specimens in log *   Findings: 6 cm hiatus   Complications: none  Implants:   Implant Name Type Inv.  Item Serial No.  Lot No. LRB No. Used Action   MESH SHT BIO ABSORBABLE 7X10CM --  - [id number]   MESH SHT BIO ABSORBABLE 7X10CM --  [id number]  [employer] N/A N/A 1 Implanted

## 2017-07-12 LAB
ANION GAP BLD CALC-SCNC: 8 MMOL/L (ref 5–15)
BASOPHILS # BLD AUTO: 0 K/UL (ref 0–0.1)
BASOPHILS # BLD: 0 % (ref 0–1)
BUN SERPL-MCNC: 8 MG/DL (ref 6–20)
BUN/CREAT SERPL: 10 (ref 12–20)
CALCIUM SERPL-MCNC: 8.5 MG/DL (ref 8.5–10.1)
CHLORIDE SERPL-SCNC: 108 MMOL/L (ref 97–108)
CO2 SERPL-SCNC: 26 MMOL/L (ref 21–32)
CREAT SERPL-MCNC: 0.79 MG/DL (ref 0.55–1.02)
EOSINOPHIL # BLD: 0 K/UL (ref 0–0.4)
EOSINOPHIL NFR BLD: 0 % (ref 0–7)
ERYTHROCYTE [DISTWIDTH] IN BLOOD BY AUTOMATED COUNT: 14.3 % (ref 11.5–14.5)
GLUCOSE SERPL-MCNC: 116 MG/DL (ref 65–100)
HCT VFR BLD AUTO: 32.8 % (ref 35–47)
HGB BLD-MCNC: 11 G/DL (ref 11.5–16)
LYMPHOCYTES # BLD AUTO: 12 % (ref 12–49)
LYMPHOCYTES # BLD: 1 K/UL (ref 0.8–3.5)
MCH RBC QN AUTO: 27.7 PG (ref 26–34)
MCHC RBC AUTO-ENTMCNC: 33.5 G/DL (ref 30–36.5)
MCV RBC AUTO: 82.6 FL (ref 80–99)
MONOCYTES # BLD: 0.5 K/UL (ref 0–1)
MONOCYTES NFR BLD AUTO: 5 % (ref 5–13)
NEUTS SEG # BLD: 7 K/UL (ref 1.8–8)
NEUTS SEG NFR BLD AUTO: 83 % (ref 32–75)
PLATELET # BLD AUTO: 303 K/UL (ref 150–400)
POTASSIUM SERPL-SCNC: 3.9 MMOL/L (ref 3.5–5.1)
RBC # BLD AUTO: 3.97 M/UL (ref 3.8–5.2)
SODIUM SERPL-SCNC: 142 MMOL/L (ref 136–145)
WBC # BLD AUTO: 8.5 K/UL (ref 3.6–11)

## 2017-07-12 PROCEDURE — 36415 COLL VENOUS BLD VENIPUNCTURE: CPT | Performed by: SURGERY

## 2017-07-12 PROCEDURE — 85025 COMPLETE CBC W/AUTO DIFF WBC: CPT | Performed by: SURGERY

## 2017-07-12 PROCEDURE — 74011000250 HC RX REV CODE- 250: Performed by: SURGERY

## 2017-07-12 PROCEDURE — 74011250636 HC RX REV CODE- 250/636: Performed by: SURGERY

## 2017-07-12 PROCEDURE — 77010033678 HC OXYGEN DAILY

## 2017-07-12 PROCEDURE — 74011250637 HC RX REV CODE- 250/637: Performed by: SURGERY

## 2017-07-12 PROCEDURE — C9113 INJ PANTOPRAZOLE SODIUM, VIA: HCPCS | Performed by: SURGERY

## 2017-07-12 PROCEDURE — 65270000029 HC RM PRIVATE

## 2017-07-12 PROCEDURE — 80048 BASIC METABOLIC PNL TOTAL CA: CPT | Performed by: SURGERY

## 2017-07-12 PROCEDURE — 77030027138 HC INCENT SPIROMETER -A

## 2017-07-12 RX ORDER — DIAZEPAM 10 MG/2ML
2 INJECTION INTRAMUSCULAR
Status: DISCONTINUED | OUTPATIENT
Start: 2017-07-12 | End: 2017-07-15 | Stop reason: HOSPADM

## 2017-07-12 RX ORDER — SIMETHICONE 80 MG
80 TABLET,CHEWABLE ORAL
Status: DISCONTINUED | OUTPATIENT
Start: 2017-07-12 | End: 2017-07-15 | Stop reason: HOSPADM

## 2017-07-12 RX ADMIN — ACETAMINOPHEN 650 MG: 650 SOLUTION ORAL at 17:38

## 2017-07-12 RX ADMIN — ONDANSETRON 4 MG: 2 INJECTION INTRAMUSCULAR; INTRAVENOUS at 23:58

## 2017-07-12 RX ADMIN — ACETAMINOPHEN 650 MG: 650 SOLUTION ORAL at 11:44

## 2017-07-12 RX ADMIN — ONDANSETRON 4 MG: 2 INJECTION INTRAMUSCULAR; INTRAVENOUS at 04:08

## 2017-07-12 RX ADMIN — ACETAMINOPHEN 650 MG: 650 SOLUTION ORAL at 00:36

## 2017-07-12 RX ADMIN — ONDANSETRON 4 MG: 2 INJECTION INTRAMUSCULAR; INTRAVENOUS at 11:44

## 2017-07-12 RX ADMIN — ONDANSETRON 4 MG: 2 INJECTION INTRAMUSCULAR; INTRAVENOUS at 17:38

## 2017-07-12 RX ADMIN — SIMETHICONE 80 MG: 80 TABLET, CHEWABLE ORAL at 17:45

## 2017-07-12 RX ADMIN — ACETAMINOPHEN 650 MG: 650 SOLUTION ORAL at 06:24

## 2017-07-12 RX ADMIN — KETOROLAC TROMETHAMINE 30 MG: 30 INJECTION, SOLUTION INTRAMUSCULAR at 00:36

## 2017-07-12 RX ADMIN — SODIUM CHLORIDE 75 ML/HR: 900 INJECTION, SOLUTION INTRAVENOUS at 20:05

## 2017-07-12 RX ADMIN — KETOROLAC TROMETHAMINE 30 MG: 30 INJECTION, SOLUTION INTRAMUSCULAR at 23:58

## 2017-07-12 RX ADMIN — SODIUM CHLORIDE 40 MG: 9 INJECTION, SOLUTION INTRAMUSCULAR; INTRAVENOUS; SUBCUTANEOUS at 20:29

## 2017-07-12 RX ADMIN — ONDANSETRON 4 MG: 2 INJECTION INTRAMUSCULAR; INTRAVENOUS at 08:52

## 2017-07-12 RX ADMIN — ENOXAPARIN SODIUM 40 MG: 40 INJECTION SUBCUTANEOUS at 20:28

## 2017-07-12 RX ADMIN — SODIUM CHLORIDE 75 ML/HR: 900 INJECTION, SOLUTION INTRAVENOUS at 06:24

## 2017-07-12 RX ADMIN — KETOROLAC TROMETHAMINE 30 MG: 30 INJECTION, SOLUTION INTRAMUSCULAR at 11:44

## 2017-07-12 RX ADMIN — ONDANSETRON 4 MG: 2 INJECTION INTRAMUSCULAR; INTRAVENOUS at 20:29

## 2017-07-12 RX ADMIN — Medication: at 06:51

## 2017-07-12 RX ADMIN — KETOROLAC TROMETHAMINE 30 MG: 30 INJECTION, SOLUTION INTRAMUSCULAR at 17:39

## 2017-07-12 RX ADMIN — ACETAMINOPHEN 650 MG: 650 SOLUTION ORAL at 23:58

## 2017-07-12 RX ADMIN — ONDANSETRON 4 MG: 2 INJECTION INTRAMUSCULAR; INTRAVENOUS at 00:36

## 2017-07-12 RX ADMIN — KETOROLAC TROMETHAMINE 30 MG: 30 INJECTION, SOLUTION INTRAMUSCULAR at 06:24

## 2017-07-12 NOTE — PROGRESS NOTES
General Surgery Daily Progress Note    Patient: Oswald Kocher MRN: [id number]  SSN: [id number]    YOB: 1970  Age: 55 y.o. Sex: female      Admit Date: 7/11/2017    Subjective:   Pain controlled, tolerating clear liquids. Han removed this morning, has yet to void. Current Facility-Administered Medications   Medication Dose Route Frequency    ondansetron (ZOFRAN) injection 4 mg  4 mg IntraVENous Q4H    0.9% sodium chloride infusion  75 mL/hr IntraVENous CONTINUOUS    enoxaparin (LOVENOX) injection 40 mg  40 mg SubCUTAneous Q24H    HYDROmorphone (PF) 15 mg/30 ml (DILAUDID) PCA   IntraVENous CONTINUOUS    acetaminophen (TYLENOL) solution 650 mg  650 mg Oral Q6H    ketorolac (TORADOL) injection 30 mg  30 mg IntraVENous Q6H    pantoprazole (PROTONIX) 40 mg in sodium chloride 0.9 % 10 mL injection  40 mg IntraVENous Q24H    prochlorperazine (COMPAZINE) injection 5 mg  5 mg IntraVENous Q8H PRN    diphenhydrAMINE (BENADRYL) injection 25 mg  25 mg IntraVENous Q6H PRN        Objective:      07/10 1901 - 07/12 0700  In: 2229 [P.O.:25; I.V.:1700]  Out: 1350 [Urine:1300]  Patient Vitals for the past 8 hrs:   BP Temp Pulse Resp SpO2   07/12/17 1124 119/72 98.4 °F (36.9 °C) 79 16 98 %   07/12/17 0758 110/60 98.6 °F (37 °C) 77 16 98 %       Physical Exam:  General: Alert, cooperative, NAD  Lungs: Unlabored  Heart:  Regular rate and rhythm  Abdomen: Soft, ATTP, mildly distended. + bowel sounds. Incisions c/d/i   Extremities: Warm, moves all, no edema  Skin:  Warm and dry, no rash    Labs: Recent Labs      07/12/17   0245   WBC  8.5   HGB  11.0*   HCT  32.8*   PLT  303     Recent Labs      07/12/17   0245   NA  142   K  3.9   CL  108   CO2  26   GLU  116*   BUN  8   CREA  0.79   CA  8.5       Assessment / Plan:   · POD#1 Lap paraesophageal hernia repair w/ mesh, Nissen   · CLD, anticipate advancing to fulls 7/15  · PCA for pain control  · OOB and ambulate, encourage IS  · Voiding trial today.  If unable to void, straight cath and repeat voiding trial.

## 2017-07-12 NOTE — PROGRESS NOTES
7/12/2017 10:06 AM Met with pt. Charted address and phone numbers confirmed. Pt will have her  at home after discharge to assist if needed. Pt was independent with adls prior to admission. Pt has rx coverage and fills her scripts at [facility]. Pt's PCP in [place] [address] recently retired, pt plans on seeing a different provider in the same office. Pt's  will transport pt home. No discharge needs identified. CM will follow.  DIEUDONNE Julio  Care Management Interventions  Current Support Network: Lives with Spouse, Own Home  Discharge Location  Discharge Placement: Home with family assistance

## 2017-07-12 NOTE — PROGRESS NOTES
Bedside shift change report given to [facility] AT RADHA ELLISON RN (oncoming nurse) by Jose Paz RN (offgoing nurse). Report included the following information SBAR, Kardex, Procedure Summary, Intake/Output and MAR.

## 2017-07-12 NOTE — PROGRESS NOTES
Primary Nurse Jaswinder Ayon RN and Reford Severin, RN performed a dual skin assessment on this patient No impairment noted  Kasi score is 23

## 2017-07-13 PROCEDURE — 65270000029 HC RM PRIVATE

## 2017-07-13 PROCEDURE — 74011250637 HC RX REV CODE- 250/637: Performed by: SURGERY

## 2017-07-13 PROCEDURE — 74011250636 HC RX REV CODE- 250/636: Performed by: SURGERY

## 2017-07-13 PROCEDURE — C9113 INJ PANTOPRAZOLE SODIUM, VIA: HCPCS | Performed by: SURGERY

## 2017-07-13 PROCEDURE — 77010033678 HC OXYGEN DAILY

## 2017-07-13 PROCEDURE — 74011250637 HC RX REV CODE- 250/637: Performed by: PHYSICIAN ASSISTANT

## 2017-07-13 PROCEDURE — 74011000250 HC RX REV CODE- 250: Performed by: SURGERY

## 2017-07-13 RX ORDER — HYDROCODONE BITARTRATE AND ACETAMINOPHEN 10; 325 MG/1; MG/1
2 TABLET ORAL
Status: DISCONTINUED | OUTPATIENT
Start: 2017-07-13 | End: 2017-07-13

## 2017-07-13 RX ORDER — HYDROMORPHONE HYDROCHLORIDE 1 MG/ML
0.5 INJECTION, SOLUTION INTRAMUSCULAR; INTRAVENOUS; SUBCUTANEOUS
Status: DISCONTINUED | OUTPATIENT
Start: 2017-07-13 | End: 2017-07-15 | Stop reason: HOSPADM

## 2017-07-13 RX ORDER — HYDROCODONE BITARTRATE AND ACETAMINOPHEN 10; 325 MG/1; MG/1
1 TABLET ORAL
Status: DISCONTINUED | OUTPATIENT
Start: 2017-07-13 | End: 2017-07-13

## 2017-07-13 RX ORDER — HYDROMORPHONE HYDROCHLORIDE 2 MG/1
2 TABLET ORAL
Status: DISCONTINUED | OUTPATIENT
Start: 2017-07-13 | End: 2017-07-15 | Stop reason: HOSPADM

## 2017-07-13 RX ADMIN — HYDROMORPHONE HYDROCHLORIDE 2 MG: 2 TABLET ORAL at 12:23

## 2017-07-13 RX ADMIN — KETOROLAC TROMETHAMINE 30 MG: 30 INJECTION, SOLUTION INTRAMUSCULAR at 18:03

## 2017-07-13 RX ADMIN — ONDANSETRON 4 MG: 2 INJECTION INTRAMUSCULAR; INTRAVENOUS at 16:00

## 2017-07-13 RX ADMIN — SIMETHICONE 80 MG: 80 TABLET, CHEWABLE ORAL at 05:23

## 2017-07-13 RX ADMIN — KETOROLAC TROMETHAMINE 30 MG: 30 INJECTION, SOLUTION INTRAMUSCULAR at 05:23

## 2017-07-13 RX ADMIN — ACETAMINOPHEN 650 MG: 650 SOLUTION ORAL at 05:23

## 2017-07-13 RX ADMIN — ONDANSETRON 4 MG: 2 INJECTION INTRAMUSCULAR; INTRAVENOUS at 10:17

## 2017-07-13 RX ADMIN — SODIUM CHLORIDE 75 ML/HR: 900 INJECTION, SOLUTION INTRAVENOUS at 10:16

## 2017-07-13 RX ADMIN — ONDANSETRON 4 MG: 2 INJECTION INTRAMUSCULAR; INTRAVENOUS at 05:23

## 2017-07-13 RX ADMIN — HYDROMORPHONE HYDROCHLORIDE 2 MG: 2 TABLET ORAL at 20:20

## 2017-07-13 RX ADMIN — HYDROMORPHONE HYDROCHLORIDE 2 MG: 2 TABLET ORAL at 16:04

## 2017-07-13 RX ADMIN — SODIUM CHLORIDE 40 MG: 9 INJECTION, SOLUTION INTRAMUSCULAR; INTRAVENOUS; SUBCUTANEOUS at 20:17

## 2017-07-13 RX ADMIN — ONDANSETRON 4 MG: 2 INJECTION INTRAMUSCULAR; INTRAVENOUS at 20:17

## 2017-07-13 RX ADMIN — ENOXAPARIN SODIUM 40 MG: 40 INJECTION SUBCUTANEOUS at 20:17

## 2017-07-13 RX ADMIN — SIMETHICONE 80 MG: 80 TABLET, CHEWABLE ORAL at 00:00

## 2017-07-13 RX ADMIN — KETOROLAC TROMETHAMINE 30 MG: 30 INJECTION, SOLUTION INTRAMUSCULAR at 11:50

## 2017-07-13 NOTE — PROGRESS NOTES
Problem: Surgical Pathway Post-Op Day 1  Goal: *No signs and symptoms of infection or wound complications  Outcome: Progressing Towards Goal  Lap sites to abd with nonadhesive dressing, no s/s of infection  Goal: *Optimal pain control at patients stated goal  Outcome: Progressing Towards Goal  Pt pain controled with pca, w minimal use.  Pt c/o gas pain-simethicone admin as ordered, pt denies passing gas, encouraging ambulation  Goal: *Tolerating diet  Outcome: Progressing Towards Goal  Pt tolerating clears, denies nausea-zofran admin q4  Goal: *Demonstrates progressive activity  Outcome: Progressing Towards Goal  Pt ambulating in hallway frequently w standby assist, tolerating well

## 2017-07-13 NOTE — PROGRESS NOTES
General Surgery Daily Progress Note    Patient: Jose Antonio Mehta MRN: [id number]  SSN: [id number]    YOB: 1970  Age: 55 y.o. Sex: female      Admit Date: 7/11/2017    Subjective:   Pain controlled, tolerating clear liquids. Passing flatus    Current Facility-Administered Medications   Medication Dose Route Frequency    diazePAM (VALIUM) injection 2 mg  2 mg IntraVENous Q6H PRN    benzocaine-menthol (CEPACOL) lozenge 1 Lozenge  1 Lozenge Mucous Membrane PRN    simethicone (MYLICON) tablet 80 mg  80 mg Oral QID PRN    ondansetron (ZOFRAN) injection 4 mg  4 mg IntraVENous Q4H    0.9% sodium chloride infusion  75 mL/hr IntraVENous CONTINUOUS    enoxaparin (LOVENOX) injection 40 mg  40 mg SubCUTAneous Q24H    HYDROmorphone (PF) 15 mg/30 ml (DILAUDID) PCA   IntraVENous CONTINUOUS    acetaminophen (TYLENOL) solution 650 mg  650 mg Oral Q6H    ketorolac (TORADOL) injection 30 mg  30 mg IntraVENous Q6H    pantoprazole (PROTONIX) 40 mg in sodium chloride 0.9 % 10 mL injection  40 mg IntraVENous Q24H    prochlorperazine (COMPAZINE) injection 5 mg  5 mg IntraVENous Q8H PRN    diphenhydrAMINE (BENADRYL) injection 25 mg  25 mg IntraVENous Q6H PRN        Objective:      07/11 1901 - 07/13 0700  In: 2256.3 [P.O.:240; I.V.:2016.3]  Out: 2500 [Urine:2500]  Patient Vitals for the past 8 hrs:   BP Temp Pulse Resp SpO2   07/13/17 0800 131/86 99.1 °F (37.3 °C) 79 16 96 %   07/13/17 0403 134/72 99.1 °F (37.3 °C) 83 16 96 %       Physical Exam:  General: Alert, cooperative, NAD  Lungs: Unlabored  Heart:  Regular rate and rhythm  Abdomen: Soft, ATTP, mildly distended. + bowel sounds.  Incisions c/d/i   Extremities: Warm, moves all, no edema  Skin:  Warm and dry, no rash    Labs:   Recent Labs      07/12/17   0245   WBC  8.5   HGB  11.0*   HCT  32.8*   PLT  303     Recent Labs      07/12/17   0245   NA  142   K  3.9   CL  108   CO2  26   GLU  116*   BUN  8   CREA  0.79   CA  8.5       Assessment / Plan:   · POD#2 Lap paraesophageal hernia repair w/ mesh, Nissen   · CLD, anticipate advancing to fulls 7/15  · D/c PCA, transition to PO pain meds  · OOB and ambulate, encourage IS  · Am labs

## 2017-07-13 NOTE — PROGRESS NOTES
Bedside shift change report given to [facility]

## 2017-07-14 LAB
ANION GAP BLD CALC-SCNC: 7 MMOL/L (ref 5–15)
BASOPHILS # BLD AUTO: 0 K/UL (ref 0–0.1)
BASOPHILS # BLD: 0 % (ref 0–1)
BUN SERPL-MCNC: 5 MG/DL (ref 6–20)
BUN/CREAT SERPL: 6 (ref 12–20)
CALCIUM SERPL-MCNC: 8.4 MG/DL (ref 8.5–10.1)
CHLORIDE SERPL-SCNC: 109 MMOL/L (ref 97–108)
CO2 SERPL-SCNC: 27 MMOL/L (ref 21–32)
CREAT SERPL-MCNC: 0.81 MG/DL (ref 0.55–1.02)
EOSINOPHIL # BLD: 0.2 K/UL (ref 0–0.4)
EOSINOPHIL NFR BLD: 3 % (ref 0–7)
ERYTHROCYTE [DISTWIDTH] IN BLOOD BY AUTOMATED COUNT: 14.2 % (ref 11.5–14.5)
GLUCOSE SERPL-MCNC: 89 MG/DL (ref 65–100)
HCT VFR BLD AUTO: 30.1 % (ref 35–47)
HGB BLD-MCNC: 10.2 G/DL (ref 11.5–16)
LYMPHOCYTES # BLD AUTO: 39 % (ref 12–49)
LYMPHOCYTES # BLD: 2.8 K/UL (ref 0.8–3.5)
MCH RBC QN AUTO: 28 PG (ref 26–34)
MCHC RBC AUTO-ENTMCNC: 33.9 G/DL (ref 30–36.5)
MCV RBC AUTO: 82.7 FL (ref 80–99)
MONOCYTES # BLD: 0.4 K/UL (ref 0–1)
MONOCYTES NFR BLD AUTO: 6 % (ref 5–13)
NEUTS SEG # BLD: 3.7 K/UL (ref 1.8–8)
NEUTS SEG NFR BLD AUTO: 52 % (ref 32–75)
PLATELET # BLD AUTO: 249 K/UL (ref 150–400)
POTASSIUM SERPL-SCNC: 3.4 MMOL/L (ref 3.5–5.1)
RBC # BLD AUTO: 3.64 M/UL (ref 3.8–5.2)
SODIUM SERPL-SCNC: 143 MMOL/L (ref 136–145)
WBC # BLD AUTO: 7.1 K/UL (ref 3.6–11)

## 2017-07-14 PROCEDURE — 85025 COMPLETE CBC W/AUTO DIFF WBC: CPT | Performed by: PHYSICIAN ASSISTANT

## 2017-07-14 PROCEDURE — 36415 COLL VENOUS BLD VENIPUNCTURE: CPT | Performed by: PHYSICIAN ASSISTANT

## 2017-07-14 PROCEDURE — 74011250636 HC RX REV CODE- 250/636: Performed by: SURGERY

## 2017-07-14 PROCEDURE — 77010033678 HC OXYGEN DAILY

## 2017-07-14 PROCEDURE — 74011000250 HC RX REV CODE- 250: Performed by: SURGERY

## 2017-07-14 PROCEDURE — 77030027138 HC INCENT SPIROMETER -A

## 2017-07-14 PROCEDURE — 80048 BASIC METABOLIC PNL TOTAL CA: CPT | Performed by: PHYSICIAN ASSISTANT

## 2017-07-14 PROCEDURE — 74011250637 HC RX REV CODE- 250/637: Performed by: SURGERY

## 2017-07-14 PROCEDURE — 74011250637 HC RX REV CODE- 250/637: Performed by: PHYSICIAN ASSISTANT

## 2017-07-14 PROCEDURE — 65270000029 HC RM PRIVATE

## 2017-07-14 PROCEDURE — C9113 INJ PANTOPRAZOLE SODIUM, VIA: HCPCS | Performed by: SURGERY

## 2017-07-14 RX ORDER — ONDANSETRON 4 MG/1
4 TABLET, ORALLY DISINTEGRATING ORAL
Qty: 12 TAB | Refills: 1 | Status: SHIPPED | OUTPATIENT
Start: 2017-07-14 | End: 2020-08-10 | Stop reason: ALTCHOICE

## 2017-07-14 RX ORDER — POLYETHYLENE GLYCOL 3350 17 G/17G
17 POWDER, FOR SOLUTION ORAL DAILY
Status: DISCONTINUED | OUTPATIENT
Start: 2017-07-14 | End: 2017-07-15 | Stop reason: HOSPADM

## 2017-07-14 RX ORDER — HYDROMORPHONE HYDROCHLORIDE 2 MG/1
2 TABLET ORAL
Qty: 30 TAB | Refills: 0 | Status: SHIPPED | OUTPATIENT
Start: 2017-07-14 | End: 2020-10-12 | Stop reason: ALTCHOICE

## 2017-07-14 RX ORDER — POLYETHYLENE GLYCOL 3350 17 G/17G
17 POWDER, FOR SOLUTION ORAL
Qty: 10 PACKET | Refills: 1 | Status: SHIPPED | OUTPATIENT
Start: 2017-07-14 | End: 2021-02-05 | Stop reason: ALTCHOICE

## 2017-07-14 RX ADMIN — ONDANSETRON 4 MG: 2 INJECTION INTRAMUSCULAR; INTRAVENOUS at 16:18

## 2017-07-14 RX ADMIN — ONDANSETRON 4 MG: 2 INJECTION INTRAMUSCULAR; INTRAVENOUS at 04:39

## 2017-07-14 RX ADMIN — KETOROLAC TROMETHAMINE 30 MG: 30 INJECTION, SOLUTION INTRAMUSCULAR at 00:14

## 2017-07-14 RX ADMIN — HYDROMORPHONE HYDROCHLORIDE 2 MG: 2 TABLET ORAL at 04:39

## 2017-07-14 RX ADMIN — ENOXAPARIN SODIUM 40 MG: 40 INJECTION SUBCUTANEOUS at 20:45

## 2017-07-14 RX ADMIN — ONDANSETRON 4 MG: 2 INJECTION INTRAMUSCULAR; INTRAVENOUS at 00:14

## 2017-07-14 RX ADMIN — KETOROLAC TROMETHAMINE 30 MG: 30 INJECTION, SOLUTION INTRAMUSCULAR at 12:09

## 2017-07-14 RX ADMIN — ONDANSETRON 4 MG: 2 INJECTION INTRAMUSCULAR; INTRAVENOUS at 12:08

## 2017-07-14 RX ADMIN — KETOROLAC TROMETHAMINE 30 MG: 30 INJECTION, SOLUTION INTRAMUSCULAR at 06:29

## 2017-07-14 RX ADMIN — POLYETHYLENE GLYCOL 3350 17 G: 17 POWDER, FOR SOLUTION ORAL at 15:22

## 2017-07-14 RX ADMIN — ONDANSETRON 4 MG: 2 INJECTION INTRAMUSCULAR; INTRAVENOUS at 22:48

## 2017-07-14 RX ADMIN — KETOROLAC TROMETHAMINE 30 MG: 30 INJECTION, SOLUTION INTRAMUSCULAR at 23:54

## 2017-07-14 RX ADMIN — HYDROMORPHONE HYDROCHLORIDE 2 MG: 2 TABLET ORAL at 20:51

## 2017-07-14 RX ADMIN — ONDANSETRON 4 MG: 2 INJECTION INTRAMUSCULAR; INTRAVENOUS at 08:36

## 2017-07-14 RX ADMIN — SODIUM CHLORIDE 40 MG: 9 INJECTION, SOLUTION INTRAMUSCULAR; INTRAVENOUS; SUBCUTANEOUS at 22:48

## 2017-07-14 NOTE — PROGRESS NOTES
7/14/2017 12:22 PM EMR reviewed no discharge needs identified. CM will continue to follow.  DIEUDONNE Dean

## 2017-07-14 NOTE — DISCHARGE INSTRUCTIONS
Nissen Fundoplication: What to Expect at [address] may be sore and have some pain in your belly for several weeks after surgery. If you had laparoscopic surgery, you also may have pain near your shoulder for a day or two after surgery. It may be hard for you to swallow for up to 6 weeks after the surgery. You may also have cramping in your belly, feel bloated, or pass more gas than before. When you burp, you may not get as much relief as you did before the surgery. The cramping and bloating usually go away in 2 to 3 months, but you may continue to pass more gas for a long time. Because the surgery makes your stomach a little smaller, you may get full more quickly when you eat. In 2 to 3 months, the stomach adjusts and you will be able to eat your usual amounts of food. How quickly you recover depends on whether you had a laparoscopic or open surgery. After laparoscopic surgery, most people can go back to work or their normal routine in about 2 to 3 weeks, depending on their work. After open surgery, you may need 4 to 6 weeks to get back to your normal routine. This care sheet gives you a general idea about how long it will take for you to recover. But each person recovers at a different pace. Follow the steps below to get better as quickly as possible. How can you care for yourself at home? Activity  · Rest when you feel tired. Getting enough sleep will help you recover. · Try to walk each day. Start out by walking a little more than you did the day before. Bit by bit, increase the amount you walk. Walking boosts blood flow and helps prevent pneumonia and constipation. · For about 2 weeks, or 4 to 6 weeks if you had an open surgery, avoid lifting objects heavier than about 15 to 20 pounds. This may include heavy grocery bags and milk containers, a heavy briefcase or backpack, cat litter or dog food bags, a vacuum , or a child.   · Do not do sit-ups or any exercise or activity that uses your belly muscles. · You can be active and do things around the house as you can tolerate it. Do not take part in any activity where you could be hit in the belly. This could be sports or playing with children. · You may shower. Pat your incisions dry. If you had an open surgery, you need to keep the incision dry until it begins to heal. Do not take baths until your doctor says it is okay. · Ask your doctor when you can drive again. · Ask your doctor when it is okay for you to have sex. Diet  · From now until August 4th (three weeks) , stay on a liquid or soft diet. This includes broths, soups, milk shakes, puddings, and mashed potatoes. When you can eat these without difficulty (after August 4th), try eating foods that are easy to swallow, such as ground meat, shredded chicken, fish, pasta, and soft vegetables. · Have 5 or 6 small meals each day instead of 2 or 3 large meals. · Chew each bite of food very well. Eat slowly. You may need to take 20 to 30 minutes to eat a meal.  · Avoid crusty breads, bagels, tough meats, raw vegetables, nuts and seeds (including crackers and breads that have nuts and seeds), and other foods that are hard to digest.  · If you feel full quickly, try to drink fluids between meals instead of with meals. · Avoid carbonated beverages, such as soda pop. · Avoid drinking with straws. This may help you swallow less air when you drink. · Gradually return to your normal foods. This usually takes 4 to 6 weeks. · You may notice that your bowel movements are not regular right after your surgery. This is common. Try to avoid constipation and straining with bowel movements. Take a fiber supplement every day. If you have not had a bowel movement after a couple of days, ask your doctor about taking a mild laxative. Medicines  · Your doctor will tell you if and when you can restart your medicines. He or she will also give you instructions about taking any new medicines.   · If you take blood thinners, such as warfarin (Coumadin), clopidogrel (Plavix), or aspirin, be sure to talk to your doctor. He or she will tell you if and when to start taking those medicines again. Make sure that you understand exactly what your doctor wants you to do. · Take pain medicines exactly as directed. ¨ If the doctor gave you a prescription medicine for pain, take it as prescribed. ¨ If you are not taking a prescription pain medicine, ask your doctor if you can take an over-the-counter medicine. · If you think your pain medicine is making you sick to your stomach:  ¨ Take your medicine after meals (unless your doctor tells you not to). ¨ Ask your doctor for a different pain medicine. · If your doctor prescribed antibiotics, take them as directed. Do not stop taking them just because you feel better. You need to take the full course of antibiotics. · Continue to take your acid-reducing medicine for 1 month after surgery or as your doctor tells you. Incision care  · If you have strips of tape on the cuts the doctor made (incisions), leave the tape on for a week or until it falls off. · Wash the area daily with warm, soapy water and pat it dry. Don't use hydrogen peroxide or alcohol, which can slow healing. You may cover the area with a gauze bandage if it weeps or rubs against clothing. Change the bandage every day. · Keep the area clean and dry. Follow-up care is a key part of your treatment and safety. Be sure to make and go to all appointments, and call your doctor if you are having problems. It's also a good idea to know your test results and keep a list of the medicines you take. When should you call for help? Call 911 anytime you think you may need emergency care. For example, call if:  · You passed out (lost consciousness). · You have severe trouble breathing. · You have sudden chest pain and shortness of breath, or you cough up blood.   · You have severe pain in your belly or chest.  Call your doctor now or seek immediate medical care if:  · You are sick to your stomach or cannot keep fluids down. · You have pain that does not get better after you take pain medicine. · You have signs of infection, such as:  ¨ Increased pain, swelling, warmth, or redness. ¨ Red streaks leading from the incision. ¨ Pus draining from the incision. ¨ A fever. · You have loose stitches, or your incision comes open. · You have signs of a blood clot, such as:  ¨ Pain in your calf, back of the knee, thigh, or groin. ¨ Redness and swelling in your leg or groin. · You have trouble passing urine or stool, especially if you have pain or swelling in your lower belly. Watch closely for any changes in your health, and be sure to contact your doctor if:  · You have a cough that does not go away or one that brings up mucus. · You have shoulder pain that lasts more than 3 days. · You do not have a bowel movement after taking a laxative. Where can you learn more? Go to http://tono-verona.info/. Enter M960 in the search box to learn more about \"Nissen Fundoplication: What to Expect at Home. \"  Current as of: August 9, 2016  Content Version: 11.3  © 9665-6880 [email or web address], [employer]. Care instructions adapted under license by [employer] (which disclaims liability or warranty for this information). If you have questions about a medical condition or this instruction, always ask your healthcare professional. [place] [address] any warranty or liability for your use of this information.

## 2017-07-14 NOTE — PROGRESS NOTES
Bedside and Verbal shift change report given to [address] (oncoming nurse) by Amanda Norwood RN (offgoing nurse). Report included the following information SBAR, Kardex, Procedure Summary, Intake/Output, MAR, Accordion and Recent Results.

## 2017-07-14 NOTE — PROGRESS NOTES
Bedside shift change report given to Lisa Moralez (oncoming nurse) by Willie Longoria (offgoing nurse). Report included the following information SBAR, Kardex, Intake/Output and MAR.

## 2017-07-14 NOTE — ANCILLARY DISCHARGE INSTRUCTIONS
[address]     07/14/17    RE: Greta Vogt    To Whom It May Concern,    This is to certify that Greta Vogt was admitted on 7/11/2017 and is under my care. She will follow up with me, scheduled tentatively for week of July 31-Aug 4. She is scheduled for work related training and travel starting on July 24th. Please allow her to walk every every hour for activity, including while on bus, train or plane. She should also observe lifting restrictions*. Please feel free to contact my office if you have any questions or concerns. Thank you for your assistance in this matter. Sincerely,          Justice Banks MD  [facility]  O:  [phone]            *Restriction includes lifting anything heavier than <10 lbs. Further restrictions include no excessive twisting, bending, stretching or waist rotation until August 21st, 2017 (6 weeks).

## 2017-07-14 NOTE — PROGRESS NOTES
NUTRITION education       Nutrition Assessment:   Wt: 164 lbs   Ht: 67\"  Body mass index is 25.69 kg/(m^2). Est kcal needs: 1850 ( msje x 1.3)  Est pro needs 60 gms ( 0.8 gms/kg)  Est fluid needs: 2980 ( 40 ml/kg)    Consult received for education s/p Lap paraesophageal hernia repair/Nissen Fundoplication. Pt states she is swallowing fine and tolerating clear liquids. Denies any weight changes prior to admission. Nutrition Diagnoses:  Nutrition/food-related knowledge deficit related to post surgical diet for Nissen fundoplication AEB need for soft diet    Intervention:   1. Brief Nutrition education (E - 1.2) including diet advancement from clears to full liquids to a soft diet. Foods groups discussed with allowed foods and foods to avoid. 2. Nutrition Counseling (C-2) including strategies for behavior modification, goal setting and planning    Monitoring/Evaluation: Pt expressed understanding of education topics and acknowledged ability in applying diet goals. Pt answered questions posed to demonstrate learning. Written information provided with contact number if needed.     Kait Amezquita RD

## 2017-07-14 NOTE — PROGRESS NOTES
Bedside shift change report given to Virginia Aguilera (oncoming nurse) by John Mistry (offgoing nurse). Report included the following information SBAR, Kardex, Intake/Output and MAR.

## 2017-07-14 NOTE — PROGRESS NOTES
Feels good, vitals/labs appropriate. Dietician consult for today. Continue clear liquids. Miralax for constipation. After two full liquid/mushy meals tomorrow, can go home tomorrow afternoon/evening.    Dilaudid/Tylenol PRN   Zofran PRN   Colace/Miralax PRN       Corby Briceño MD

## 2017-07-15 VITALS
HEART RATE: 74 BPM | TEMPERATURE: 98.4 F | WEIGHT: 164 LBS | OXYGEN SATURATION: 100 % | DIASTOLIC BLOOD PRESSURE: 82 MMHG | HEIGHT: 67 IN | BODY MASS INDEX: 25.74 KG/M2 | RESPIRATION RATE: 16 BRPM | SYSTOLIC BLOOD PRESSURE: 120 MMHG

## 2017-07-15 PROCEDURE — 74011250636 HC RX REV CODE- 250/636: Performed by: SURGERY

## 2017-07-15 PROCEDURE — 74011250637 HC RX REV CODE- 250/637: Performed by: SURGERY

## 2017-07-15 RX ADMIN — POLYETHYLENE GLYCOL 3350 17 G: 17 POWDER, FOR SOLUTION ORAL at 08:51

## 2017-07-15 RX ADMIN — KETOROLAC TROMETHAMINE 30 MG: 30 INJECTION, SOLUTION INTRAMUSCULAR at 05:08

## 2017-07-15 RX ADMIN — KETOROLAC TROMETHAMINE 30 MG: 30 INJECTION, SOLUTION INTRAMUSCULAR at 11:46

## 2017-07-15 RX ADMIN — ONDANSETRON 4 MG: 2 INJECTION INTRAMUSCULAR; INTRAVENOUS at 08:51

## 2017-07-15 RX ADMIN — ONDANSETRON 4 MG: 2 INJECTION INTRAMUSCULAR; INTRAVENOUS at 11:46

## 2017-07-15 RX ADMIN — SIMETHICONE 80 MG: 80 TABLET, CHEWABLE ORAL at 00:03

## 2017-07-15 RX ADMIN — ONDANSETRON 4 MG: 2 INJECTION INTRAMUSCULAR; INTRAVENOUS at 05:08

## 2017-07-15 NOTE — PROGRESS NOTES
General Surgery Progress Note      S: Pain controlled on current regimen. No nausea. Full liquid diet ordered. Patient Vitals for the past 24 hrs:   Temp Pulse Resp BP SpO2   07/15/17 0331 98.4 °F (36.9 °C) 74 16 131/71 96 %   07/14/17 2345 99.4 °F (37.4 °C) 76 16 122/69 97 %   07/14/17 2041 97.5 °F (36.4 °C) 74 16 (!) 150/91 99 %   07/14/17 1734 98.4 °F (36.9 °C) 70 17 138/89 99 %   07/14/17 1223 98.5 °F (36.9 °C) 73 16 133/88 96 %                  Physical Exam:      General: NAD, A&Ox3  Resp: CTAB  CV: RRR  Abdomen: soft, appropriately tender, incisions without signs of infection  Extremity: no edema    Lab Results   Component Value Date/Time    WBC 7.1 07/14/2017 03:30 AM    HGB 10.2 07/14/2017 03:30 AM    HCT 30.1 07/14/2017 03:30 AM    PLATELET 475 06/74/3520 03:30 AM    MCV 82.7 07/14/2017 03:30 AM       Lab Results   Component Value Date/Time    Sodium 143 07/14/2017 03:30 AM    Potassium 3.4 07/14/2017 03:30 AM    Chloride 109 07/14/2017 03:30 AM    CO2 27 07/14/2017 03:30 AM    Anion gap 7 07/14/2017 03:30 AM    Glucose 89 07/14/2017 03:30 AM    BUN 5 07/14/2017 03:30 AM    Creatinine 0.81 07/14/2017 03:30 AM    BUN/Creatinine ratio 6 07/14/2017 03:30 AM    GFR est AA >60 07/14/2017 03:30 AM    GFR est non-AA >60 07/14/2017 03:30 AM    Calcium 8.4 07/14/2017 03:30 AM        No results found for: INR, PTMR, PTP, PT1, PT2        A/P:  46F POD 4 s/p laparoscopic paraesophageal hernia repair with Nissen fundoplication. Doing well. Advance diet to full liquids. If she tolerates advancement will discharge home today of full liquids.   Outpatient follow-up with Dr. Dani Valderrama in 2 weeks      Yasmani Herrera MD

## 2017-07-15 NOTE — PROGRESS NOTES
Bedside and Verbal shift change report given to [address] (oncoming nurse) by Boston Serrano RN (offgoing nurse). Report included the following information SBAR, Kardex, MAR and Recent Results.

## 2017-07-24 NOTE — DISCHARGE SUMMARY
Discharge Summary    Patient: Katey Millard               Sex: female          DOA: 7/11/2017 11:38 AM       YOB: 1970      Age:  55 y.o.        LOS:  LOS: 4 days                Discharge Date:  7/15/2017    Admission Diagnoses: PARAESOPHAGEAL HERNIA  Acid reflux    Discharge Diagnoses:  Same    Procedure:  Procedure(s):  LAPAROSCOPIC PARAESOPHAGEAL HERNIA REPAIR WITH MESH  NISSEN FUNDOPLICATION    Discharge Condition: Good    Hospital Course: Unremarkable operative procedure. Discharge to home in stable condition. Consults: None    Significant Diagnostic Studies: See full electronic record. Discharge Medications:   See electronic record. Activity/Diet/Wound Care: See patient administered discharge instructions.     Follow-up: 2 weeks    Ambrocio Grant MD  [facility]  Office:  [phone]

## 2017-08-04 ENCOUNTER — HOSPITAL ENCOUNTER (OUTPATIENT)
Dept: GENERAL RADIOLOGY | Age: 47
Discharge: HOME | End: 2017-08-04
Attending: SURGERY
Payer: COMMERCIAL

## 2017-08-04 DIAGNOSIS — K21.9 GERD (GASTROESOPHAGEAL REFLUX DISEASE): ICD-10-CM

## 2017-08-04 PROCEDURE — 74241 XR UPPER GI SERIES W KUB: CPT

## 2020-08-06 VITALS
HEART RATE: 68 BPM | DIASTOLIC BLOOD PRESSURE: 62 MMHG | HEIGHT: 67 IN | OXYGEN SATURATION: 98 % | SYSTOLIC BLOOD PRESSURE: 116 MMHG | WEIGHT: 159 LBS | BODY MASS INDEX: 24.96 KG/M2 | TEMPERATURE: 98.6 F

## 2020-08-06 PROBLEM — K59.00 CONSTIPATION: Status: ACTIVE | Noted: 2020-08-06

## 2020-08-06 PROBLEM — D25.9 UTERINE LEIOMYOMA: Status: ACTIVE | Noted: 2020-08-06

## 2020-08-06 PROBLEM — R10.9 ABDOMINAL PAIN: Status: ACTIVE | Noted: 2020-08-06

## 2020-08-10 ENCOUNTER — OFFICE VISIT (OUTPATIENT)
Dept: GASTROENTEROLOGY | Age: 50
End: 2020-08-10
Payer: COMMERCIAL

## 2020-08-10 VITALS
BODY MASS INDEX: 25.11 KG/M2 | HEIGHT: 67 IN | WEIGHT: 160 LBS | DIASTOLIC BLOOD PRESSURE: 70 MMHG | RESPIRATION RATE: 14 BRPM | SYSTOLIC BLOOD PRESSURE: 110 MMHG | TEMPERATURE: 97.4 F | OXYGEN SATURATION: 98 % | HEART RATE: 68 BPM

## 2020-08-10 DIAGNOSIS — J31.2 CHRONIC SORE THROAT: ICD-10-CM

## 2020-08-10 DIAGNOSIS — K21.00 GASTROESOPHAGEAL REFLUX DISEASE WITH ESOPHAGITIS: Primary | ICD-10-CM

## 2020-08-10 PROCEDURE — 99213 OFFICE O/P EST LOW 20 MIN: CPT | Performed by: INTERNAL MEDICINE

## 2020-08-10 RX ORDER — SUCRALFATE 1 G/1
TABLET ORAL
COMMUNITY
Start: 2020-05-27

## 2020-08-10 RX ORDER — CITALOPRAM 10 MG/1
TABLET ORAL
COMMUNITY
Start: 2020-06-29

## 2020-08-10 RX ORDER — MULTIVITAMIN
1 CAPSULE ORAL DAILY
COMMUNITY

## 2020-08-10 RX ORDER — FAMOTIDINE 40 MG/1
TABLET, FILM COATED ORAL
COMMUNITY
Start: 2020-05-19 | End: 2020-10-12 | Stop reason: ALTCHOICE

## 2020-08-10 RX ORDER — CHOLESTYRAMINE 4 G/9G
POWDER, FOR SUSPENSION ORAL
COMMUNITY
Start: 2020-07-28 | End: 2020-11-10

## 2020-08-10 RX ORDER — PANTOPRAZOLE SODIUM 40 MG/1
TABLET, DELAYED RELEASE ORAL
COMMUNITY
Start: 2020-05-27

## 2020-08-13 NOTE — PROGRESS NOTES
Don Nieto is a 52 y.o. female who presents today for the following:  Chief Complaint   Patient presents with    Abdominal Pain     hpylori stool test neg on 5-, gastrin level normal on 6-1-2020.  GERD     no improvement with cholestiramine         Allergies   Allergen Reactions    Percocet [Oxycodone-Acetaminophen] Hives and Itching       Current Outpatient Medications   Medication Sig    cholestyramine-sucrose 4 gram powder     citalopram (CELEXA) 10 mg tablet TAKE 1 TABLET BY MOUTH EVERY DAY    multivitamin capsule Take 1 Cap by mouth daily.  sucralfate (CARAFATE) 1 gram tablet TAKE 1 TABLET BY MOUTH FOUR TIMES A DAY    pantoprazole (PROTONIX) 40 mg tablet TAKE 1 TABLET BY MOUTH EVERY DAY    famotidine (PEPCID) 40 mg tablet TAKE 1 TABLET BY MOUTH EVERYDAY AT BEDTIME    HYDROmorphone (DILAUDID) 2 mg tablet Take 1 Tab by mouth every four (4) hours as needed. Max Daily Amount: 12 mg. Indications: Pain, Acute postoperative pain    polyethylene glycol (MIRALAX) 17 gram packet Take 1 Packet by mouth daily as needed. Indications: constipation     No current facility-administered medications for this visit.         Past Medical History:   Diagnosis Date    Abdominal pain 8/6/2020    Arrhythmia 06/2016    palpitations    Constipation 8/6/2020    GERD (gastroesophageal reflux disease)     Uterine leiomyoma 8/6/2020       Past Surgical History:   Procedure Laterality Date    COLONOSCOPY  09/14/2018    SCREENING-NORMAL    HX OTHER SURGICAL      NISSEN FUNDIPLICATION    HX PELVIC LAPAROSCOPY  2009       Family History   Problem Relation Age of Onset    Diabetes Father        Social History     Socioeconomic History    Marital status:      Spouse name: Not on file    Number of children: Not on file    Years of education: Not on file    Highest education level: Not on file   Occupational History    Not on file   Social Needs    Financial resource strain: Not on file   [place] insecurity     Worry: Not on file     Inability: Not on file    Transportation needs     Medical: Not on file     Non-medical: Not on file   Tobacco Use    Smoking status: Never Smoker    Smokeless tobacco: Never Used   Substance and Sexual Activity    Alcohol use: Never     Frequency: Never    Drug use: No    Sexual activity: Not on file   Lifestyle    Physical activity     Days per week: Not on file     Minutes per session: Not on file    Stress: Not on file   Relationships    Social connections     Talks on phone: Not on file     Gets together: Not on file     Attends Jewish service: Not on file     Active member of club or organization: Not on file     Attends meetings of clubs or organizations: Not on file     Relationship status: Not on file    Intimate partner violence     Fear of current or ex partner: Not on file     Emotionally abused: Not on file     Physically abused: Not on file     Forced sexual activity: Not on file   Other Topics Concern    Not on file   Social History Narrative    Not on file         HPI  70-year-old female with history of gastroesophageal reflux disease, status post Nissen fundoplication who comes in for follow-up visit. Patient states she is doing about the same. She continues have burning in her throat. She states she does have less gas and the increase allowable and her throat has decreased some. Patient states she did take the cholestyramine. Continues to take the pantoprazole and sucralfate. Review of Systems   Constitutional: Negative. HENT: Negative. Eyes: Negative. Respiratory: Negative. Cardiovascular: Negative. Gastrointestinal: Negative. Genitourinary: Negative. Musculoskeletal: Negative. Skin: Negative. Neurological: Negative. Endo/Heme/Allergies: Negative. Psychiatric/Behavioral: Negative. All other systems reviewed and are negative.         Visit Vitals  /70 (BP 1 Location: Right arm, BP Patient Position: Sitting)   Pulse 68   Temp 97.4 °F (36.3 °C) (Skin)   Resp 14   Ht 5' 7\" (1.702 m)   Wt 72.6 kg (160 lb)   SpO2 98% Comment: room air   BMI 25.06 kg/m²     Physical Exam  Vitals signs and nursing note reviewed. Constitutional:       Appearance: Normal appearance. She is obese. HENT:      Head: Normocephalic and atraumatic. Nose: Nose normal.      Mouth/Throat:      Mouth: Mucous membranes are moist.      Pharynx: Oropharynx is clear. Eyes:      Conjunctiva/sclera: Conjunctivae normal.      Pupils: Pupils are equal, round, and reactive to light. Neck:      Musculoskeletal: Normal range of motion and neck supple. Cardiovascular:      Rate and Rhythm: Normal rate and regular rhythm. Pulses: Normal pulses. Heart sounds: Normal heart sounds. Pulmonary:      Effort: Pulmonary effort is normal.      Breath sounds: Normal breath sounds. Abdominal:      General: Bowel sounds are normal.      Palpations: Abdomen is soft. Tenderness: There is abdominal tenderness. There is no guarding or rebound. Musculoskeletal: Normal range of motion. Skin:     General: Skin is warm and dry. Neurological:      General: No focal deficit present. Mental Status: She is alert and oriented to person, place, and time. Psychiatric:         Mood and Affect: Mood normal.         Behavior: Behavior normal.         Thought Content: Thought content normal.         Judgment: Judgment normal.            1. Gastroesophageal reflux disease with esophagitis  Even after the Nissen fundoplication the patient continues to have significant reflux disease which goes up to the oropharynx. She was started on cholestyramine which has helped some and thus that indicates that bowel reflux may be a contributing factor. The fundoplication however does not appear to be working well. We will continue the cholestyramine 1 dose daily and might consider increasing that dose to twice daily.   Patient did note increased constipation with the cholestyramine however. Patient has been instructed to take MiraLAX 2 doses every evening to assist with this issue.   2. Chronic sore throat  Is secondary to gastroesophageal reflux disease

## 2020-10-12 ENCOUNTER — OFFICE VISIT (OUTPATIENT)
Dept: GASTROENTEROLOGY | Age: 50
End: 2020-10-12
Payer: COMMERCIAL

## 2020-10-12 VITALS
HEIGHT: 67 IN | WEIGHT: 163 LBS | SYSTOLIC BLOOD PRESSURE: 110 MMHG | HEART RATE: 83 BPM | OXYGEN SATURATION: 98 % | BODY MASS INDEX: 25.58 KG/M2 | RESPIRATION RATE: 14 BRPM | TEMPERATURE: 96.9 F | DIASTOLIC BLOOD PRESSURE: 64 MMHG

## 2020-10-12 DIAGNOSIS — J31.2 CHRONIC SORE THROAT: ICD-10-CM

## 2020-10-12 DIAGNOSIS — K21.00 BILE REFLUX ESOPHAGITIS: ICD-10-CM

## 2020-10-12 DIAGNOSIS — K21.00 GASTROESOPHAGEAL REFLUX DISEASE WITH ESOPHAGITIS WITHOUT HEMORRHAGE: Primary | ICD-10-CM

## 2020-10-12 PROCEDURE — 99213 OFFICE O/P EST LOW 20 MIN: CPT | Performed by: INTERNAL MEDICINE

## 2020-10-12 RX ORDER — MULTIVITAMIN WITH IRON
1 TABLET ORAL
COMMUNITY
End: 2020-10-12 | Stop reason: ALTCHOICE

## 2020-10-12 NOTE — PROGRESS NOTES
Steff Carver is a 48 y.o. female who presents today for the following:  Chief Complaint   Patient presents with    Abdominal Pain     is better, but still has a sour taste, and burning in chest some         Allergies   Allergen Reactions    Percocet [Oxycodone-Acetaminophen] Hives and Itching       Current Outpatient Medications   Medication Sig    cholestyramine-sucrose 4 gram powder FOR BILE REFLUX    citalopram (CELEXA) 10 mg tablet TAKE 1 TABLET BY MOUTH EVERY DAY    multivitamin capsule Take 1 Cap by mouth daily.  pantoprazole (PROTONIX) 40 mg tablet TAKE 1 TABLET BY MOUTH EVERY DAY    sucralfate (CARAFATE) 1 gram tablet TAKE 1 TABLET BY MOUTH FOUR TIMES A DAY    polyethylene glycol (MIRALAX) 17 gram packet Take 1 Packet by mouth daily as needed. Indications: constipation     No current facility-administered medications for this visit.         Past Medical History:   Diagnosis Date    Abdominal pain 8/6/2020    Arrhythmia 06/2016    palpitations    Constipation 8/6/2020    GERD (gastroesophageal reflux disease)     Uterine leiomyoma 8/6/2020       Past Surgical History:   Procedure Laterality Date    COLONOSCOPY  09/14/2018    SCREENING-NORMAL    HX OTHER SURGICAL      NISSEN FUNDIPLICATION    HX PELVIC LAPAROSCOPY  2009       Family History   Problem Relation Age of Onset    Diabetes Father        Social History     Socioeconomic History    Marital status:      Spouse name: Not on file    Number of children: Not on file    Years of education: Not on file    Highest education level: Not on file   Occupational History    Not on file   Social Needs    Financial resource strain: Not on file    Food insecurity     Worry: Not on file     Inability: Not on file    Transportation needs     Medical: Not on file     Non-medical: Not on file   Tobacco Use    Smoking status: Never Smoker    Smokeless tobacco: Never Used   Substance and Sexual Activity    Alcohol use: Never Frequency: Never    Drug use: No    Sexual activity: Not on file   Lifestyle    Physical activity     Days per week: Not on file     Minutes per session: Not on file    Stress: Not on file   Relationships    Social connections     Talks on phone: Not on file     Gets together: Not on file     Attends Orthodoxy service: Not on file     Active member of club or organization: Not on file     Attends meetings of clubs or organizations: Not on file     Relationship status: Not on file    Intimate partner violence     Fear of current or ex partner: Not on file     Emotionally abused: Not on file     Physically abused: Not on file     Forced sexual activity: Not on file   Other Topics Concern    Not on file   Social History Narrative    Not on file         HPI   59-year-old female with history of gastroesophageal reflux disease status post Nissen fundoplication who comes in for follow-up visit for persistent block symptoms. Patient states she is done better. She has no more burning in her head into the ear. Continues have a bitter taste in her mouth and some soreness in her throat. She continues have some obvious reflux symptoms. She has taken the cholestyramine 1 dose daily. Patient states she does not take the MiraLAX for constipation no longer and instead she eats 1 apple per day which does help. Review of Systems   Constitutional: Negative. HENT: Negative. Eyes: Negative. Respiratory: Negative. Cardiovascular: Negative. Gastrointestinal: Positive for abdominal pain, constipation and heartburn. Negative for blood in stool, diarrhea, melena, nausea and vomiting. Genitourinary: Negative. Musculoskeletal: Negative. Skin: Negative. Neurological: Negative. Endo/Heme/Allergies: Negative. Psychiatric/Behavioral: Negative. All other systems reviewed and are negative.         Visit Vitals  /64 (BP 1 Location: Left arm, BP Patient Position: Sitting)   Pulse 83   Temp 96.9 °F (36.1 °C) (Skin)   Resp 14   Ht 5' 7\" (1.702 m)   Wt 73.9 kg (163 lb)   SpO2 98% Comment: room air   BMI 25.53 kg/m²     Physical Exam  Vitals signs and nursing note reviewed. Constitutional:       General: She is not in acute distress. Appearance: Normal appearance. She is normal weight. She is not ill-appearing. HENT:      Head: Normocephalic and atraumatic. Nose: Nose normal.      Mouth/Throat:      Mouth: Mucous membranes are moist.      Pharynx: Oropharynx is clear. Eyes:      General: No scleral icterus. Conjunctiva/sclera: Conjunctivae normal.      Pupils: Pupils are equal, round, and reactive to light. Neck:      Musculoskeletal: Normal range of motion and neck supple. Cardiovascular:      Rate and Rhythm: Normal rate and regular rhythm. Pulses: Normal pulses. Heart sounds: Normal heart sounds. Pulmonary:      Effort: Pulmonary effort is normal.      Breath sounds: Normal breath sounds. Abdominal:      General: Bowel sounds are normal. There is no distension. Palpations: Abdomen is soft. There is no mass. Tenderness: There is abdominal tenderness. There is right CVA tenderness, left CVA tenderness and rebound. There is no guarding. Hernia: No hernia is present. Musculoskeletal: Normal range of motion. Skin:     General: Skin is warm and dry. Coloration: Skin is not jaundiced. Neurological:      General: No focal deficit present. Mental Status: She is alert and oriented to person, place, and time. Psychiatric:         Mood and Affect: Mood normal.         Behavior: Behavior normal.         Thought Content: Thought content normal.         Judgment: Judgment normal.            1. Gastroesophageal reflux disease with esophagitis without hemorrhage  Patient appears to be less symptomatic, so the present therapy appears to be helping however has not resolved all her symptoms. The fundoplication does not appear to helping at this point.   We will continue therapy with the cholestyramine along with daily PPI therapy for now. 2. Chronic sore throat  Still present but less prominent. 3. Bile reflux esophagitis  Believe the underlying cause of her present symptoms that is the reason that the cholestyramine is helping.

## 2020-10-12 NOTE — PROGRESS NOTES
1. Have you been to the ER, urgent care clinic since your last visit? Hospitalized since your last visit? NO    2. Have you seen or consulted any other health care providers outside of the [address] [place] [address] since your last visit? Include any pap smears or colon screening.  NO.

## 2020-11-10 DIAGNOSIS — K21.9 GASTRO-ESOPHAGEAL REFLUX DISEASE WITHOUT ESOPHAGITIS: ICD-10-CM

## 2020-11-10 RX ORDER — CHOLESTYRAMINE 4 G/9G
POWDER, FOR SUSPENSION ORAL
Qty: 378 G | Refills: 3 | Status: SHIPPED | OUTPATIENT
Start: 2020-11-10 | End: 2021-02-05 | Stop reason: ALTCHOICE

## 2020-11-16 VITALS
BODY MASS INDEX: 24.96 KG/M2 | TEMPERATURE: 98.6 F | DIASTOLIC BLOOD PRESSURE: 62 MMHG | RESPIRATION RATE: 20 BRPM | HEART RATE: 68 BPM | OXYGEN SATURATION: 98 % | SYSTOLIC BLOOD PRESSURE: 116 MMHG | HEIGHT: 67 IN | WEIGHT: 159 LBS

## 2020-11-16 RX ORDER — POLYETHYLENE GLYCOL 3350, SODIUM SULFATE ANHYDROUS, SODIUM BICARBONATE, SODIUM CHLORIDE, POTASSIUM CHLORIDE 236; 22.74; 6.74; 5.86; 2.97 G/4L; G/4L; G/4L; G/4L; G/4L
POWDER, FOR SOLUTION ORAL
COMMUNITY
End: 2021-02-05 | Stop reason: ALTCHOICE

## 2020-11-16 RX ORDER — HYDROGEN PEROXIDE 3 %
SOLUTION, NON-ORAL MISCELLANEOUS DAILY
COMMUNITY
End: 2021-02-05 | Stop reason: ALTCHOICE

## 2020-11-16 RX ORDER — ERGOCALCIFEROL 1.25 MG/1
50000 CAPSULE ORAL
COMMUNITY
End: 2021-02-05 | Stop reason: ALTCHOICE

## 2020-11-16 RX ORDER — FAMOTIDINE 40 MG/1
40 TABLET, FILM COATED ORAL DAILY
COMMUNITY

## 2020-12-22 ENCOUNTER — TELEPHONE (OUTPATIENT)
Dept: GASTROENTEROLOGY | Age: 50
End: 2020-12-22

## 2020-12-22 DIAGNOSIS — R19.7 DIARRHEA DUE TO MALABSORPTION: Primary | ICD-10-CM

## 2020-12-22 DIAGNOSIS — K90.9 DIARRHEA DUE TO MALABSORPTION: Primary | ICD-10-CM

## 2020-12-22 RX ORDER — MONTELUKAST SODIUM 4 MG/1
1 TABLET, CHEWABLE ORAL 2 TIMES DAILY
Qty: 60 TAB | Refills: 3 | Status: SHIPPED | OUTPATIENT
Start: 2020-12-22 | End: 2021-04-19

## 2020-12-22 NOTE — TELEPHONE ENCOUNTER
Mraia Dolores called, her cholestyramine power has pieces of aluminen in it.  She will show them to pharmacist. She would like the colestipol tabs sent to [facility].

## 2021-02-05 ENCOUNTER — OFFICE VISIT (OUTPATIENT)
Dept: GASTROENTEROLOGY | Age: 51
End: 2021-02-05
Payer: COMMERCIAL

## 2021-02-05 VITALS
HEIGHT: 67 IN | HEART RATE: 69 BPM | BODY MASS INDEX: 26.21 KG/M2 | OXYGEN SATURATION: 97 % | DIASTOLIC BLOOD PRESSURE: 70 MMHG | RESPIRATION RATE: 14 BRPM | WEIGHT: 167 LBS | SYSTOLIC BLOOD PRESSURE: 120 MMHG | TEMPERATURE: 96.8 F

## 2021-02-05 DIAGNOSIS — K21.00 GASTROESOPHAGEAL REFLUX DISEASE WITH ESOPHAGITIS WITHOUT HEMORRHAGE: Primary | ICD-10-CM

## 2021-02-05 DIAGNOSIS — K21.00 BILE REFLUX ESOPHAGITIS: ICD-10-CM

## 2021-02-05 DIAGNOSIS — K21.9 LARYNGOPHARYNGEAL REFLUX DISEASE: ICD-10-CM

## 2021-02-05 PROCEDURE — 99214 OFFICE O/P EST MOD 30 MIN: CPT | Performed by: INTERNAL MEDICINE

## 2021-02-05 NOTE — PROGRESS NOTES
1. Have you been to the ER, urgent care clinic since your last visit? Hospitalized since your last visit? NO    2. Have you seen or consulted any other health care providers outside of the [address] [place] [address] since your last visit? Include any pap smears or colon screening.  NO.

## 2021-02-06 NOTE — PROGRESS NOTES
Kaitlin Garrido is a 48 y.o. female who presents today for the following:  Chief Complaint   Patient presents with    Follow-up     follow up 10- visit    GERD    Abdominal Pain         Allergies   Allergen Reactions    Percocet [Oxycodone-Acetaminophen] Hives and Itching       Current Outpatient Medications   Medication Sig    colestipoL (COLESTID) 1 gram tablet Take 1 Tab by mouth two (2) times a day. Indications: high cholesterol    B CMPLX 4-VIT D3-C-FOLIC-ZINC PO Take  by mouth.  citalopram (CELEXA) 10 mg tablet TAKE 1 TABLET BY MOUTH EVERY DAY    multivitamin capsule Take 1 Cap by mouth daily.  famotidine (PEPCID) 40 mg tablet Take 40 mg by mouth daily.  sucralfate (CARAFATE) 1 gram tablet TAKE 1 TABLET BY MOUTH FOUR TIMES A DAY    pantoprazole (PROTONIX) 40 mg tablet TAKE 1 TABLET BY MOUTH EVERY DAY     No current facility-administered medications for this visit.         Past Medical History:   Diagnosis Date    Abdominal pain 8/6/2020    Arrhythmia 06/2016    palpitations    Constipation 8/6/2020    GERD (gastroesophageal reflux disease)     Uterine leiomyoma 8/6/2020       Past Surgical History:   Procedure Laterality Date    COLONOSCOPY  09/14/2018    SCREENING-NORMAL    HX OTHER SURGICAL      NISSEN FUNDIPLICATION    HX PELVIC LAPAROSCOPY  2009       Family History   Problem Relation Age of Onset    Diabetes Father     Hypertension Father        Social History     Socioeconomic History    Marital status:      Spouse name: Not on file    Number of children: Not on file    Years of education: Not on file    Highest education level: Not on file   Occupational History    Not on file   Social Needs    Financial resource strain: Not on file    Food insecurity     Worry: Not on file     Inability: Not on file    Transportation needs     Medical: Not on file     Non-medical: Not on file   Tobacco Use    Smoking status: Never Smoker    Smokeless tobacco: Never Used Substance and Sexual Activity    Alcohol use: Never     Frequency: Never    Drug use: No    Sexual activity: Not on file   Lifestyle    Physical activity     Days per week: Not on file     Minutes per session: Not on file    Stress: Not on file   Relationships    Social connections     Talks on phone: Not on file     Gets together: Not on file     Attends Confucianist service: Not on file     Active member of club or organization: Not on file     Attends meetings of clubs or organizations: Not on file     Relationship status: Not on file    Intimate partner violence     Fear of current or ex partner: Not on file     Emotionally abused: Not on file     Physically abused: Not on file     Forced sexual activity: Not on file   Other Topics Concern    Not on file   Social History Narrative    Not on file         HPI  22-year-old female with history of esophageal reflux disease, status post laparoscopic Nissen fundoplication comes in for follow-up visit. Patient states she has not noted a lot of improvement. She still has burning in her mouth and throat. She takes the colestipol 1 g twice a day and the pantoprazole 40 mg daily. She states she may take the Carafate as needed. Patient states she believes the cholestyramine powder actually did work a little better but it was less convenient than the colestipol. She states she has a burning anytime and even after drinking Gatorade today. Patient also states she wants to know if \"throat coat tea\" be okay for her to use to help with the symptoms. She was told by me that I was unaware of the product but would look into it. Review of Systems   Constitutional: Negative. HENT: Positive for sore throat. Negative for nosebleeds. Eyes: Negative. Respiratory: Negative. Cardiovascular: Negative. Gastrointestinal: Positive for abdominal pain, constipation and heartburn. Negative for blood in stool, diarrhea, melena, nausea and vomiting.    Genitourinary: Negative. Musculoskeletal: Negative. Skin: Negative. Neurological: Negative. Endo/Heme/Allergies: Negative. Psychiatric/Behavioral: Negative. All other systems reviewed and are negative. Visit Vitals  /70 (BP 1 Location: Right upper arm, BP Patient Position: Sitting, BP Cuff Size: Large adult)   Pulse 69   Temp 96.8 °F (36 °C) (Skin)   Resp 14   Ht 5' 7\" (1.702 m)   Wt 75.8 kg (167 lb)   SpO2 97% Comment: room air   BMI 26.16 kg/m²     Physical Exam  Vitals signs and nursing note reviewed. Constitutional:       General: She is not in acute distress. Appearance: Normal appearance. She is obese. She is not ill-appearing. HENT:      Head: Normocephalic and atraumatic. Nose: Nose normal.      Mouth/Throat:      Mouth: Mucous membranes are moist.      Pharynx: Oropharynx is clear. Eyes:      General: No scleral icterus. Conjunctiva/sclera: Conjunctivae normal.      Pupils: Pupils are equal, round, and reactive to light. Neck:      Musculoskeletal: Normal range of motion and neck supple. Cardiovascular:      Rate and Rhythm: Normal rate and regular rhythm. Pulses: Normal pulses. Heart sounds: Normal heart sounds. Pulmonary:      Effort: Pulmonary effort is normal.      Breath sounds: Normal breath sounds. Abdominal:      General: Bowel sounds are normal. There is no distension. Palpations: Abdomen is soft. There is no mass. Tenderness: There is abdominal tenderness. There is guarding. There is no right CVA tenderness, left CVA tenderness or rebound. Hernia: No hernia is present. Musculoskeletal: Normal range of motion. Skin:     General: Skin is warm and dry. Coloration: Skin is not jaundiced. Neurological:      General: No focal deficit present. Mental Status: She is alert and oriented to person, place, and time.    Psychiatric:         Mood and Affect: Mood normal.         Behavior: Behavior normal.         Thought Content: Thought content normal.         Judgment: Judgment normal.            1. Gastroesophageal reflux disease with esophagitis without hemorrhage  We will continue present therapy except will increase the colestipol to 1 g 3 times a day. She was also told we may need to adjust dose further if necessary    2. Laryngopharyngeal reflux disease  Probably related to a combination of acid and bowel reflux disease. 3. Bile reflux esophagitis  We will continue the colestipol and increase it to 1 g 3 times daily.

## 2021-04-19 DIAGNOSIS — R19.7 DIARRHEA DUE TO MALABSORPTION: ICD-10-CM

## 2021-04-19 DIAGNOSIS — K90.9 DIARRHEA DUE TO MALABSORPTION: ICD-10-CM

## 2021-04-19 RX ORDER — MONTELUKAST SODIUM 4 MG/1
TABLET, CHEWABLE ORAL
Qty: 60 TAB | Refills: 3 | Status: SHIPPED | OUTPATIENT
Start: 2021-04-19

## 2021-07-23 ENCOUNTER — OFFICE VISIT (OUTPATIENT)
Dept: GASTROENTEROLOGY | Age: 51
End: 2021-07-23
Payer: COMMERCIAL

## 2021-07-23 VITALS
SYSTOLIC BLOOD PRESSURE: 110 MMHG | DIASTOLIC BLOOD PRESSURE: 60 MMHG | BODY MASS INDEX: 25.68 KG/M2 | OXYGEN SATURATION: 98 % | HEIGHT: 67 IN | RESPIRATION RATE: 16 BRPM | TEMPERATURE: 97.5 F | HEART RATE: 68 BPM | WEIGHT: 163.6 LBS

## 2021-07-23 DIAGNOSIS — K21.00 GASTROESOPHAGEAL REFLUX DISEASE WITH ESOPHAGITIS WITHOUT HEMORRHAGE: Primary | ICD-10-CM

## 2021-07-23 DIAGNOSIS — R10.13 EPIGASTRIC PAIN: ICD-10-CM

## 2021-07-23 DIAGNOSIS — K21.00 BILE REFLUX ESOPHAGITIS: ICD-10-CM

## 2021-07-23 DIAGNOSIS — K21.9 LARYNGOPHARYNGEAL REFLUX DISEASE: ICD-10-CM

## 2021-07-23 DIAGNOSIS — J31.2 CHRONIC SORE THROAT: ICD-10-CM

## 2021-07-23 PROCEDURE — 99214 OFFICE O/P EST MOD 30 MIN: CPT | Performed by: INTERNAL MEDICINE

## 2021-07-23 RX ORDER — MONTELUKAST SODIUM 4 MG/1
1 TABLET, CHEWABLE ORAL
Qty: 90 TABLET | Refills: 5 | Status: SHIPPED | OUTPATIENT
Start: 2021-07-23

## 2021-07-23 NOTE — PROGRESS NOTES
1. Have you been to the ER, urgent care clinic since your last visit? Hospitalized since your last visit? no    2. Have you seen or consulted any other health care providers outside of the [address] [place] [address] since your last visit? Include any pap smears or colon screening.   .no  Chief Complaint   Patient presents with    Follow-up     still has acid in throat     Visit Vitals  /60 (BP 1 Location: Left upper arm, BP Patient Position: Sitting, BP Cuff Size: Adult)   Pulse 68   Temp 97.5 °F (36.4 °C) (Temporal)   Resp 16   Ht 5' 7\" (1.702 m)   Wt 74.2 kg (163 lb 9.6 oz)   SpO2 98% Comment: room air   BMI 25.62 kg/m²

## 2021-07-24 NOTE — PROGRESS NOTES
Roselyn Cueva is a 48 y.o. female who presents today for the following:  Chief Complaint   Patient presents with    Follow-up     still has acid in throat         Allergies   Allergen Reactions    Percocet [Oxycodone-Acetaminophen] Hives and Itching       Current Outpatient Medications   Medication Sig    colestipoL (COLESTID) 1 gram tablet Take 1 Tablet by mouth three (3) times daily (with meals). Indications: high cholesterol    colestipoL (COLESTID) 1 gram tablet TAKE 1 TABLET BY MOUTH TWICE A DAY FOR HIGH COLESTEROL    B CMPLX 4-VIT D3-C-FOLIC-ZINC PO Take  by mouth.  citalopram (CELEXA) 10 mg tablet TAKE 1 TABLET BY MOUTH EVERY DAY    multivitamin capsule Take 1 Cap by mouth daily.  famotidine (PEPCID) 40 mg tablet Take 40 mg by mouth daily. (Patient not taking: Reported on 7/23/2021)    sucralfate (CARAFATE) 1 gram tablet TAKE 1 TABLET BY MOUTH FOUR TIMES A DAY (Patient not taking: Reported on 7/23/2021)    pantoprazole (PROTONIX) 40 mg tablet TAKE 1 TABLET BY MOUTH EVERY DAY (Patient not taking: Reported on 7/23/2021)     No current facility-administered medications for this visit.        Past Medical History:   Diagnosis Date    Abdominal pain 8/6/2020    Arrhythmia 06/2016    palpitations    Constipation 8/6/2020    GERD (gastroesophageal reflux disease)     Uterine leiomyoma 8/6/2020       Past Surgical History:   Procedure Laterality Date    COLONOSCOPY  09/14/2018    SCREENING-NORMAL    HX OTHER SURGICAL      NISSEN FUNDIPLICATION    HX PELVIC LAPAROSCOPY  2009       Family History   Problem Relation Age of Onset    Diabetes Father     Hypertension Father        Social History     Socioeconomic History    Marital status:      Spouse name: Not on file    Number of children: Not on file    Years of education: Not on file    Highest education level: Not on file   Occupational History    Not on file   Tobacco Use    Smoking status: Never Smoker    Smokeless tobacco: Never Used   Vaping Use    Vaping Use: Never used   Substance and Sexual Activity    Alcohol use: Never    Drug use: No    Sexual activity: Not on file   Other Topics Concern    Not on file   Social History Narrative    Not on file     Social Determinants of Health     Financial Resource Strain:     Difficulty of Paying Living Expenses:    Food Insecurity:     Worried About Running Out of Food in the Last Year:     [address] in the Last Year:    Transportation Needs:     Lack of Transportation (Medical):  Lack of Transportation (Non-Medical):    Physical Activity:     Days of Exercise per Week:     Minutes of Exercise per Session:    Stress:     Feeling of Stress :    Social Connections:     Frequency of Communication with Friends and Family:     Frequency of Social Gatherings with Friends and Family:     Attends Anabaptism Services:     Active Member of Clubs or Organizations:     Attends Club or Organization Meetings:     Marital Status:    Intimate Partner Violence:     Fear of Current or Ex-Partner:     Emotionally Abused:     Physically Abused:     Sexually Abused:          HPI  60-year-old female with history of gastroesophageal reflux disease, status post laparoscopic Nissen fundoplication who comes in for evaluation of persistent GERD symptoms and sore throat. Patient states she still has a lot of problems with her throat. She has been taking the colestipol 3 times a day so she has run out of that medication since it was originally prescribed for twice daily. She however has not noted a lot of difference since starting the medications. She states her symptoms all day and night. She has not taken any PPI or H2 receptor antagonist recently. Her bowel movements are not regular and she has more constipation recently. She states in the morning when she brings up some Stelara it may be yellow in color. No coughing.   She has noted a change in her voice which she believes is getting deeper. She states at times she can have regurgitation of material up into her ears, left greater than right. Patient has had work-up which includes an EGD and she had blood for gastrin levels which was normal.    Review of Systems   Constitutional: Negative. HENT: Positive for ear pain (Bilateral). Negative for ear discharge, nosebleeds and sore throat. Eyes: Negative. Respiratory: Positive for sputum production. Cardiovascular: Negative. Gastrointestinal: Positive for abdominal pain, heartburn, nausea and vomiting. Negative for blood in stool, constipation, diarrhea and melena. Genitourinary: Negative. Musculoskeletal: Negative. Skin: Negative. Neurological: Negative. Endo/Heme/Allergies: Negative. Psychiatric/Behavioral: Negative. All other systems reviewed and are negative. Visit Vitals  /60 (BP 1 Location: Left upper arm, BP Patient Position: Sitting, BP Cuff Size: Adult)   Pulse 68   Temp 97.5 °F (36.4 °C) (Temporal)   Resp 16   Ht 5' 7\" (1.702 m)   Wt 74.2 kg (163 lb 9.6 oz)   SpO2 98% Comment: room air   BMI 25.62 kg/m²     Physical Exam  Vitals and nursing note reviewed. Constitutional:       Appearance: Normal appearance. She is normal weight. HENT:      Head: Normocephalic and atraumatic. Nose: Nose normal.      Mouth/Throat:      Mouth: Mucous membranes are moist.      Pharynx: Oropharynx is clear. Eyes:      General: No scleral icterus. Conjunctiva/sclera: Conjunctivae normal.      Pupils: Pupils are equal, round, and reactive to light. Cardiovascular:      Rate and Rhythm: Normal rate and regular rhythm. Pulses: Normal pulses. Heart sounds: Normal heart sounds. Pulmonary:      Effort: Pulmonary effort is normal.      Breath sounds: Normal breath sounds. Abdominal:      General: Bowel sounds are normal. There is no distension. Palpations: Abdomen is soft. There is no mass. Tenderness:  There is abdominal tenderness. There is no right CVA tenderness, left CVA tenderness, guarding or rebound. Hernia: No hernia is present. Musculoskeletal:         General: Normal range of motion. Cervical back: Normal range of motion and neck supple. Tenderness present. Skin:     General: Skin is warm and dry. Coloration: Skin is not jaundiced. Neurological:      General: No focal deficit present. Mental Status: She is alert and oriented to person, place, and time. Psychiatric:         Mood and Affect: Mood normal.         Behavior: Behavior normal.         Thought Content: Thought content normal.         Judgment: Judgment normal.            1. Gastroesophageal reflux disease with esophagitis without hemorrhage  We will continue the colestipol for now. Patient was advised to give a trial of papaya. May also suggest a possible aloe if the papaya is not helpful. - colestipoL (COLESTID) 1 gram tablet; Take 1 Tablet by mouth three (3) times daily (with meals). Indications: high cholesterol  Dispense: 90 Tablet; Refill: 5    2. Bile reflux esophagitis    - colestipoL (COLESTID) 1 gram tablet; Take 1 Tablet by mouth three (3) times daily (with meals). Indications: high cholesterol  Dispense: 90 Tablet; Refill: 5    3. Laryngopharyngeal reflux disease      4. Chronic sore throat  Secondary to reflux into the throat. Yellow sputum in the mornings may suggest certain degree of drainage from sinuses. She has been seen by ENT.     5. Epigastric pain

## 2021-07-31 RX ORDER — MONTELUKAST SODIUM 4 MG/1
TABLET, CHEWABLE ORAL
Qty: 60 TABLET | Refills: 2 | Status: SHIPPED | OUTPATIENT
Start: 2021-07-31 | End: 2022-01-05

## 2021-11-02 ENCOUNTER — TRANSCRIBE ORDER (OUTPATIENT)
Dept: SCHEDULING | Age: 51
End: 2021-11-02

## 2021-11-02 DIAGNOSIS — R10.2 PELVIC AND PERINEAL PAIN: Primary | ICD-10-CM

## 2022-01-05 DIAGNOSIS — K90.9 DIARRHEA DUE TO MALABSORPTION: Primary | ICD-10-CM

## 2022-01-05 DIAGNOSIS — R19.7 DIARRHEA DUE TO MALABSORPTION: Primary | ICD-10-CM

## 2022-01-05 RX ORDER — MONTELUKAST SODIUM 4 MG/1
TABLET, CHEWABLE ORAL
Qty: 180 TABLET | Refills: 3 | Status: SHIPPED | OUTPATIENT
Start: 2022-01-05

## 2022-03-19 PROBLEM — D25.9 UTERINE LEIOMYOMA: Status: ACTIVE | Noted: 2020-08-06

## 2022-03-19 PROBLEM — K21.9 ACID REFLUX: Status: ACTIVE | Noted: 2017-07-11

## 2022-03-20 PROBLEM — R10.9 ABDOMINAL PAIN: Status: ACTIVE | Noted: 2020-08-06

## 2022-03-20 PROBLEM — K59.00 CONSTIPATION: Status: ACTIVE | Noted: 2020-08-06

## 2023-04-06 ENCOUNTER — TRANSCRIBE ORDER (OUTPATIENT)
Dept: REGISTRATION | Age: 53
End: 2023-04-06

## 2023-04-06 ENCOUNTER — HOSPITAL ENCOUNTER (OUTPATIENT)
Dept: LAB | Age: 53
End: 2023-04-06
Payer: COMMERCIAL

## 2024-04-17 NOTE — PROGRESS NOTES
Bedside shift change report given to Zena Carrillo Helena, 

## 2024-04-29 ENCOUNTER — TRANSCRIBE ORDERS (OUTPATIENT)
Facility: HOSPITAL | Age: 54
End: 2024-04-29

## 2024-04-29 ENCOUNTER — HOSPITAL ENCOUNTER (OUTPATIENT)
Facility: HOSPITAL | Age: 54
Discharge: HOME | End: 2024-05-02
Payer: COMMERCIAL

## 2024-04-29 DIAGNOSIS — R12 HEARTBURN: Primary | ICD-10-CM

## 2024-04-29 DIAGNOSIS — E78.5 HYPERLIPIDEMIA, UNSPECIFIED HYPERLIPIDEMIA TYPE: ICD-10-CM

## 2024-04-29 DIAGNOSIS — E66.3 SEVERELY OVERWEIGHT: ICD-10-CM

## 2024-04-29 DIAGNOSIS — R12 HEARTBURN: ICD-10-CM

## 2024-04-29 LAB
ALBUMIN SERPL-MCNC: 3.6 G/DL (ref 3.5–5)
ALBUMIN/GLOB SERPL: 0.9 (ref 1.1–2.2)
ALP SERPL-CCNC: 84 U/L (ref 45–117)
ALT SERPL-CCNC: 17 U/L (ref 12–78)
ANION GAP SERPL CALC-SCNC: 7 MMOL/L (ref 5–15)
AST SERPL W P-5'-P-CCNC: 22 U/L (ref 15–37)
BASOPHILS # BLD: 0 K/UL (ref 0–0.1)
BASOPHILS NFR BLD: 1 % (ref 0–1)
BILIRUB SERPL-MCNC: 0.4 MG/DL (ref 0.2–1)
BUN SERPL-MCNC: 9 MG/DL (ref 6–20)
BUN/CREAT SERPL: 11 (ref 12–20)
CA-I BLD-MCNC: 8.9 MG/DL (ref 8.5–10.1)
CHLORIDE SERPL-SCNC: 103 MMOL/L (ref 97–108)
CHOLEST SERPL-MCNC: 194 MG/DL
CO2 SERPL-SCNC: 29 MMOL/L (ref 21–32)
CREAT SERPL-MCNC: 0.8 MG/DL (ref 0.55–1.02)
DIFFERENTIAL METHOD BLD: ABNORMAL
EOSINOPHIL # BLD: 0.1 K/UL (ref 0–0.4)
EOSINOPHIL NFR BLD: 2 % (ref 0–7)
ERYTHROCYTE [DISTWIDTH] IN BLOOD BY AUTOMATED COUNT: 14 % (ref 11.5–14.5)
GLOBULIN SER CALC-MCNC: 3.8 G/DL (ref 2–4)
GLUCOSE SERPL-MCNC: 93 MG/DL (ref 65–100)
HCT VFR BLD AUTO: 34.8 % (ref 35–47)
HDLC SERPL-MCNC: 54 MG/DL
HDLC SERPL: 3.6 (ref 0–5)
HGB BLD-MCNC: 11.9 G/DL (ref 11.5–16)
IMM GRANULOCYTES # BLD AUTO: 0 K/UL (ref 0–0.04)
IMM GRANULOCYTES NFR BLD AUTO: 0 % (ref 0–0.5)
LDLC SERPL CALC-MCNC: 119.8 MG/DL (ref 0–100)
LIPID PANEL: ABNORMAL
LYMPHOCYTES # BLD: 2.7 K/UL (ref 0.8–3.5)
LYMPHOCYTES NFR BLD: 51 % (ref 12–49)
MCH RBC QN AUTO: 28.4 PG (ref 26–34)
MCHC RBC AUTO-ENTMCNC: 34.2 G/DL (ref 30–36.5)
MCV RBC AUTO: 83.1 FL (ref 80–99)
MONOCYTES # BLD: 0.3 K/UL (ref 0–1)
MONOCYTES NFR BLD: 6 % (ref 5–13)
NEUTS SEG # BLD: 2.2 K/UL (ref 1.8–8)
NEUTS SEG NFR BLD: 40 % (ref 32–75)
NRBC # BLD: 0 K/UL (ref 0–0.01)
NRBC BLD-RTO: 0 PER 100 WBC
PLATELET # BLD AUTO: 321 K/UL (ref 150–400)
PMV BLD AUTO: 9.2 FL (ref 8.9–12.9)
POTASSIUM SERPL-SCNC: 3.4 MMOL/L (ref 3.5–5.1)
PROT SERPL-MCNC: 7.4 G/DL (ref 6.4–8.2)
RBC # BLD AUTO: 4.19 M/UL (ref 3.8–5.2)
SODIUM SERPL-SCNC: 139 MMOL/L (ref 136–145)
TRIGL SERPL-MCNC: 101 MG/DL
VLDLC SERPL CALC-MCNC: 20.2 MG/DL
WBC # BLD AUTO: 5.3 K/UL (ref 3.6–11)

## 2024-04-29 PROCEDURE — 80053 COMPREHEN METABOLIC PANEL: CPT

## 2024-04-29 PROCEDURE — 85025 COMPLETE CBC W/AUTO DIFF WBC: CPT

## 2024-04-29 PROCEDURE — 36415 COLL VENOUS BLD VENIPUNCTURE: CPT

## 2024-04-29 PROCEDURE — 80061 LIPID PANEL: CPT

## (undated) LAB
ALBUMIN SERPL-MCNC: 3.8 G/DL (ref 3.5–5)
ALBUMIN/GLOB SERPL: 1.1 (ref 1.1–2.2)
ALP SERPL-CCNC: 86 U/L (ref 45–117)
ALT SERPL-CCNC: 23 U/L (ref 12–78)
ANION GAP SERPL CALC-SCNC: 7 MMOL/L (ref 5–15)
AST SERPL W P-5'-P-CCNC: 23 U/L (ref 15–37)
BASOPHILS # BLD: 0 K/UL (ref 0–0.1)
BASOPHILS NFR BLD: 1 % (ref 0–1)
BILIRUB SERPL-MCNC: 0.3 MG/DL (ref 0.2–1)
BUN SERPL-MCNC: 9 MG/DL (ref 6–20)
BUN/CREAT SERPL: 12 (ref 12–20)
CA-I BLD-MCNC: 9.7 MG/DL (ref 8.5–10.1)
CHLORIDE SERPL-SCNC: 105 MMOL/L (ref 97–108)
CHOLEST SERPL-MCNC: 188 MG/DL
CO2 SERPL-SCNC: 29 MMOL/L (ref 21–32)
CREAT SERPL-MCNC: 0.77 MG/DL (ref 0.55–1.02)
DIFFERENTIAL METHOD BLD: (no result)
EOSINOPHIL # BLD: 0.1 K/UL (ref 0–0.4)
EOSINOPHIL NFR BLD: 2 % (ref 0–7)
ERYTHROCYTE [DISTWIDTH] IN BLOOD BY AUTOMATED COUNT: 14.1 % (ref 11.5–14.5)
GLOBULIN SER CALC-MCNC: 3.6 G/DL (ref 2–4)
GLUCOSE SERPL-MCNC: 90 MG/DL (ref 65–100)
HCT VFR BLD AUTO: 35.6 % (ref 35–47)
HDLC SERPL-MCNC: 62 MG/DL
HDLC SERPL: 3 (ref 0–5)
HGB BLD-MCNC: 11.9 G/DL (ref 11.5–16)
IMM GRANULOCYTES # BLD AUTO: 0 K/UL (ref 0–0.04)
IMM GRANULOCYTES NFR BLD AUTO: 0 % (ref 0–0.5)
LDLC SERPL CALC-MCNC: 112.4 MG/DL (ref 0–100)
LIPID PROFILE,FLP: (no result)
LYMPHOCYTES # BLD: 3.2 K/UL (ref 0.8–3.5)
LYMPHOCYTES NFR BLD: 52 % (ref 12–49)
MCH RBC QN AUTO: 28.2 PG (ref 26–34)
MCHC RBC AUTO-ENTMCNC: 33.4 G/DL (ref 30–36.5)
MCV RBC AUTO: 84.4 FL (ref 80–99)
MONOCYTES # BLD: 0.4 K/UL (ref 0–1)
MONOCYTES NFR BLD: 7 % (ref 5–13)
NEUTS SEG # BLD: 2.3 K/UL (ref 1.8–8)
NEUTS SEG NFR BLD: 38 % (ref 32–75)
NRBC # BLD: 0 K/UL (ref 0–0.01)
NRBC BLD-RTO: 0 PER 100 WBC
PLATELET # BLD AUTO: 312 K/UL (ref 150–400)
PMV BLD AUTO: 9.5 FL (ref 8.9–12.9)
POTASSIUM SERPL-SCNC: 3.9 MMOL/L (ref 3.5–5.1)
PROT SERPL-MCNC: 7.4 G/DL (ref 6.4–8.2)
RBC # BLD AUTO: 4.22 M/UL (ref 3.8–5.2)
SODIUM SERPL-SCNC: 141 MMOL/L (ref 136–145)
TRIGL SERPL-MCNC: 68 MG/DL (ref ?–150)
VLDLC SERPL CALC-MCNC: 13.6 MG/DL
WBC # BLD AUTO: 6.1 K/UL (ref 3.6–11)

## (undated) DEVICE — Z DISCONTINUED NO SUB IDED TROCAR LAP DIA8MM STD LEN BLDELSS TAPR TIP THRD N OPT VW

## (undated) DEVICE — NEEDLE HYPO 22GA L1.5IN BLK S STL HUB POLYPR SHLD REG BVL

## (undated) DEVICE — TRAY CATH 16F DRN BG LTX -- CONVERT TO ITEM 363158

## (undated) DEVICE — TELFA NON-ADHERENT ABSORBENT DRESSING: Brand: TELFA

## (undated) DEVICE — STAPLER INT ARTC RELDABLE W/ 3X8 AND 1X5 DP PURCH TACK RELD

## (undated) DEVICE — KENDALL SCD EXPRESS SLEEVES, KNEE LENGTH, MEDIUM: Brand: KENDALL SCD

## (undated) DEVICE — VISUALIZATION SYSTEM: Brand: CLEARIFY

## (undated) DEVICE — SUTURE ENDOLOOP SZ 0 L18IN ABSRB VLT LIG SLDE KNOT VCRL

## (undated) DEVICE — (D)STRIP SKN CLSR 0.5X4IN WHT --

## (undated) DEVICE — SUTURE SZ 0 27IN 5/8 CIR UR-6  TAPER PT VIOLET ABSRB VICRYL J603H

## (undated) DEVICE — TUBING INSUFLTN 10FT LUER -- CONVERT TO ITEM 368568

## (undated) DEVICE — INFECTION CONTROL KIT SYS

## (undated) DEVICE — COVER LT HNDL BLU PLAS

## (undated) DEVICE — TROCAR: Brand: KII SLEEVE

## (undated) DEVICE — TOWEL SURG W17XL27IN STD BLU COT NONFENESTRATED PREWASHED

## (undated) DEVICE — (D)PREP SKN CHLRAPRP APPL 26ML -- CONVERT TO ITEM 371833

## (undated) DEVICE — STERILE POLYISOPRENE POWDER-FREE SURGICAL GLOVES: Brand: PROTEXIS

## (undated) DEVICE — DEVON™ KNEE AND BODY STRAP 60" X 3" (1.5 M X 7.6 CM): Brand: DEVON

## (undated) DEVICE — CLICKLINE SCISSORS INSERT: Brand: CLICKLINE

## (undated) DEVICE — Device

## (undated) DEVICE — SURGICAL PROCEDURE KIT GEN LAPAROSCOPY LF

## (undated) DEVICE — DRAPE,REIN 53X77,STERILE: Brand: MEDLINE

## (undated) DEVICE — TROCAR: Brand: KII FIOS FIRST ENTRY

## (undated) DEVICE — SUT ETHBND 0 36IN SH DA GRN --

## (undated) DEVICE — 3M™ TEGADERM™ TRANSPARENT FILM DRESSING FRAME STYLE, 1624W, 2-3/8 IN X 2-3/4 IN (6 CM X 7 CM), 100/CT 4CT/CASE: Brand: 3M™ TEGADERM™

## (undated) DEVICE — SUTURE MCRYL SZ 4-0 L27IN ABSRB UD L19MM PS-2 1/2 CIR PRIM Y426H

## (undated) DEVICE — (D)SYR 10ML 1/5ML GRAD NSAF -- PKGING CHANGE USE ITEM 338027

## (undated) DEVICE — BLUNT TIP LAPAROSCOPIC SEALER/DIVIDER: Brand: LIGASURE

## (undated) DEVICE — REM POLYHESIVE ADULT PATIENT RETURN ELECTRODE: Brand: VALLEYLAB